# Patient Record
Sex: FEMALE | Race: WHITE | NOT HISPANIC OR LATINO | Employment: FULL TIME | URBAN - METROPOLITAN AREA
[De-identification: names, ages, dates, MRNs, and addresses within clinical notes are randomized per-mention and may not be internally consistent; named-entity substitution may affect disease eponyms.]

---

## 2023-02-14 PROBLEM — M85.80 OSTEOPENIA: Status: ACTIVE | Noted: 2023-02-14

## 2023-02-14 PROBLEM — E53.8 VITAMIN B12 DEFICIENCY: Status: ACTIVE | Noted: 2023-02-14

## 2023-02-14 PROBLEM — M75.42 IMPINGEMENT SYNDROME OF LEFT SHOULDER: Status: ACTIVE | Noted: 2023-02-14

## 2023-02-14 PROBLEM — E78.9 BORDERLINE HIGH CHOLESTEROL: Status: ACTIVE | Noted: 2023-02-14

## 2023-02-14 PROBLEM — R53.81 MALAISE AND FATIGUE: Status: ACTIVE | Noted: 2023-02-14

## 2023-02-14 PROBLEM — R53.83 MALAISE AND FATIGUE: Status: ACTIVE | Noted: 2023-02-14

## 2023-02-14 PROBLEM — L21.0 SEBORRHEA CAPITIS: Status: ACTIVE | Noted: 2023-02-14

## 2023-02-14 PROBLEM — L30.9 ECZEMA: Status: ACTIVE | Noted: 2023-02-14

## 2023-02-14 PROBLEM — R19.5 POSITIVE COLORECTAL CANCER SCREENING USING COLOGUARD TEST: Status: ACTIVE | Noted: 2023-02-14

## 2023-02-14 PROBLEM — G44.311 INTRACTABLE ACUTE POST-TRAUMATIC HEADACHE: Status: ACTIVE | Noted: 2023-02-14

## 2023-02-14 PROBLEM — H61.21 IMPACTED CERUMEN OF RIGHT EAR: Status: ACTIVE | Noted: 2023-02-14

## 2023-02-14 PROBLEM — K63.5 COLON POLYPS: Status: ACTIVE | Noted: 2023-02-14

## 2023-04-04 RX ORDER — KETOROLAC TROMETHAMINE 30 MG/ML
1 INJECTION, SOLUTION INTRAMUSCULAR; INTRAVENOUS
COMMUNITY
End: 2023-04-05 | Stop reason: ALTCHOICE

## 2023-04-05 ENCOUNTER — OFFICE VISIT (OUTPATIENT)
Dept: PRIMARY CARE | Facility: CLINIC | Age: 68
End: 2023-04-05
Payer: COMMERCIAL

## 2023-04-05 VITALS
OXYGEN SATURATION: 98 % | TEMPERATURE: 96.8 F | WEIGHT: 113 LBS | DIASTOLIC BLOOD PRESSURE: 82 MMHG | HEART RATE: 92 BPM | BODY MASS INDEX: 20.8 KG/M2 | SYSTOLIC BLOOD PRESSURE: 148 MMHG | HEIGHT: 62 IN

## 2023-04-05 DIAGNOSIS — J06.9 ACUTE URI: ICD-10-CM

## 2023-04-05 DIAGNOSIS — E78.9 BORDERLINE HIGH CHOLESTEROL: ICD-10-CM

## 2023-04-05 DIAGNOSIS — H91.90 HEARING LOSS, UNSPECIFIED HEARING LOSS TYPE, UNSPECIFIED LATERALITY: ICD-10-CM

## 2023-04-05 DIAGNOSIS — K63.5 POLYP OF COLON, UNSPECIFIED PART OF COLON, UNSPECIFIED TYPE: ICD-10-CM

## 2023-04-05 DIAGNOSIS — L30.9 ECZEMA, UNSPECIFIED TYPE: ICD-10-CM

## 2023-04-05 DIAGNOSIS — Z11.59 NEED FOR HEPATITIS C SCREENING TEST: ICD-10-CM

## 2023-04-05 DIAGNOSIS — M85.80 OSTEOPENIA, UNSPECIFIED LOCATION: ICD-10-CM

## 2023-04-05 DIAGNOSIS — Z00.00 MEDICARE ANNUAL WELLNESS VISIT, SUBSEQUENT: Primary | ICD-10-CM

## 2023-04-05 DIAGNOSIS — E53.8 VITAMIN B12 DEFICIENCY: ICD-10-CM

## 2023-04-05 DIAGNOSIS — Z23 ENCOUNTER FOR IMMUNIZATION: ICD-10-CM

## 2023-04-05 PROBLEM — M75.42 IMPINGEMENT SYNDROME OF LEFT SHOULDER: Status: RESOLVED | Noted: 2023-02-14 | Resolved: 2023-04-05

## 2023-04-05 PROBLEM — G44.311 INTRACTABLE ACUTE POST-TRAUMATIC HEADACHE: Status: RESOLVED | Noted: 2023-02-14 | Resolved: 2023-04-05

## 2023-04-05 PROBLEM — R19.5 POSITIVE COLORECTAL CANCER SCREENING USING COLOGUARD TEST: Status: RESOLVED | Noted: 2023-02-14 | Resolved: 2023-04-05

## 2023-04-05 PROCEDURE — 90677 PCV20 VACCINE IM: CPT | Performed by: FAMILY MEDICINE

## 2023-04-05 PROCEDURE — G0439 PPPS, SUBSEQ VISIT: HCPCS | Performed by: FAMILY MEDICINE

## 2023-04-05 PROCEDURE — 90471 IMMUNIZATION ADMIN: CPT | Performed by: FAMILY MEDICINE

## 2023-04-05 PROCEDURE — 1159F MED LIST DOCD IN RCRD: CPT | Performed by: FAMILY MEDICINE

## 2023-04-05 PROCEDURE — 99214 OFFICE O/P EST MOD 30 MIN: CPT | Performed by: FAMILY MEDICINE

## 2023-04-05 PROCEDURE — 1160F RVW MEDS BY RX/DR IN RCRD: CPT | Performed by: FAMILY MEDICINE

## 2023-04-05 PROCEDURE — 1170F FXNL STATUS ASSESSED: CPT | Performed by: FAMILY MEDICINE

## 2023-04-05 PROCEDURE — 1036F TOBACCO NON-USER: CPT | Performed by: FAMILY MEDICINE

## 2023-04-05 RX ORDER — CLOBETASOL PROPIONATE 0.46 MG/ML
SOLUTION TOPICAL
COMMUNITY
Start: 2023-03-01 | End: 2023-10-05 | Stop reason: ALTCHOICE

## 2023-04-05 RX ORDER — FLUTICASONE FUROATE 27.5 UG/1
1 SPRAY, METERED NASAL
Qty: 10 G | Refills: 5 | Status: SHIPPED | OUTPATIENT
Start: 2023-04-05 | End: 2023-10-05 | Stop reason: ALTCHOICE

## 2023-04-05 RX ORDER — DUPILUMAB 300 MG/2ML
INJECTION, SOLUTION SUBCUTANEOUS
COMMUNITY
Start: 2023-03-17

## 2023-04-05 ASSESSMENT — PATIENT HEALTH QUESTIONNAIRE - PHQ9
1. LITTLE INTEREST OR PLEASURE IN DOING THINGS: NOT AT ALL
SUM OF ALL RESPONSES TO PHQ9 QUESTIONS 1 AND 2: 0
2. FEELING DOWN, DEPRESSED OR HOPELESS: NOT AT ALL

## 2023-04-05 ASSESSMENT — VISUAL ACUITY
OD_CC: 20/40
OS_CC: 20/40

## 2023-04-05 ASSESSMENT — ACTIVITIES OF DAILY LIVING (ADL)
DOING_HOUSEWORK: INDEPENDENT
TAKING_MEDICATION: INDEPENDENT
MANAGING_FINANCES: INDEPENDENT
GROCERY_SHOPPING: INDEPENDENT
DRESSING: INDEPENDENT

## 2023-04-05 NOTE — PROGRESS NOTES
Subjective   Patient ID: Delaney Novoa is a 67 y.o. female who presents for medicare well  HPI    The patient is being seen for the annual wellness visit.   Past Medical, Surgical and Family History: reviewed and updated in chart.   Interval History: Patient has not been hospitalized previously.   Medications and Supplements: Medications and supplements, including calcium and vitamins reviewed and updated in chart.    No, the patient is not using opioids.   Patient Self Assessment of Health Status: good.   Tobacco use: Non-User   Alcohol use: User   Illicit drug use: Non-User   Current diet: unhealthy diet, does consume adequate fluids and does consume caffeine.   Exercise Frequency: regularly.   Depression/Suicide Screening:  .   During the past 2 weeks, the patient has not felt down, depressed or hopeless.    During the past 2 weeks, the patient has not felt little interest or pleasure in doing things.    Hearing Impairment: Patient has slight hearing impairment, on the right.   Cognitive Impairment: No cognitive impairment observed.     Bathing: performs independently.   Dressing: performs independently.   Walking: performs independently.   Toileting: performs independently.   Feeding: performs independently.   Personal Hygiene: performs independently.   Bowels: continent.   Bladder: continent.   Managing Finances: performs independently.   Shopping: performs independently.   Managing Medications: performs independently.   Housework / Basic Home Maintenance: performs independently.   Handling Transportation: performs independently.   Preparing Meals: performs independently.    Falls Risk Screening:. DELANEY has not fallen in the last 6 months. Her fall did not result in injury.   Home safety risk factors: none.   Advance directives:. Advance Directives discussed, verbal or written information provided if indicated. Patient has living will. Patient has healthcare POA.     The patient's health since the last visit is  described as good. There are no interval changes in the patient's PMH, PSH, and current medications. There are no interval changes in the patient's social and family history. She does not have regular dental visits. The patient wears dentures. She complains of vision problems. Vision care includes wearing glasses. The patient complains of worsening vision and macular degneration starting. She denies hearing loss. Immunizations status: not up to date.   Reproductive health: the patient is postmenopausal.   Cervical cancer screening: cancer screening reviewed and current . patient has no history of an abnormal pap smear.       Screening interval recommendation: Dr Leal in [place]. 1 year ago.   Breast cancer screening: cancer screening reviewed and current. Screening interval recommendation: 11/2020.   Colorectal Cancer Screening: colonoscopy ordered. a colonoscopy was performed Feb 2022 and polyps and has 3-5 years.   Metabolic screening: lipid profile performed within the past five years.     Review of Systems    Objective   Physical Exam  General: Patient is alert and oriented ×3 and appears in no acute distress. Next    Head: Atraumatic normocephalic    Eyes: EOMI, PERRLA    Ears: Right canal is impacted with cerumen.  Left canal clear without any inflammation to the tympanic membrane.    Mouth: Moist mucosa, no oral lesions, tonsils are normal in size without erythema, there is some drainage down the posterior pharynx is clear. Has dentures.  Drainage down the posterior pharynx    Neck: No adenopathy    Heart: Regular rate and rhythm no murmurs clicks or gallops    Lungs: Clear to auscultation bilaterally without rhonchi rales or wheezing.    Abdomen: Soft, nontender, no rigidity rebound or guarding    Musculoskeletal: Strength is grossly intact at 5 out of 5 2010 reflexes intact, sensation intact. Patient had negative open can test on the left, positive Jacob sign on the left    Skin: Patient has flaky  white dry rash occurring on her scalp. Also has some occurring on her ears.     Assessment/Plan   Problem List Items Addressed This Visit    None  Reviewed in for the patient's past medical history, surgical history, family history, social history  Depression screening was done in the office today using PHQ-2  We reviewed the patient's activities of daily living and possible risk for falling. Patient is stable.  Discussed preventative screenings  Vaccinations were discussed in the office. We did review the patient's status on influenza vaccination, pneumonia vaccination, tetanus vaccination, and given Prevnar 13 on October 27, 2021 and flu vaccination on October 27, 2021  Patient was instructed to follow-up with dentist regularly and also with eye doctor regularly  We discussed advanced directives including power of , living well and DO NOT RESUSCITATE orders    Labs ordered     Acute URI- Suggested Mucinex DM, salt water nasal sprays and fluticasone nasal spray.  If worsening symptoms she was instructed to contact the office    Eczema-USe steroid cream as needed and Dr Yeimy Osborn sees her.  Dupixant started and helping.     Osteopenia   Doing exericse  Taking Calcium and Vitamin D    Polyps  - Repeat colonoscopy in 2-4 years

## 2023-04-06 ENCOUNTER — TELEPHONE (OUTPATIENT)
Dept: PRIMARY CARE | Facility: CLINIC | Age: 68
End: 2023-04-06
Payer: COMMERCIAL

## 2023-04-06 DIAGNOSIS — T78.40XA ALLERGIC REACTION TO DRUG, INITIAL ENCOUNTER: Primary | ICD-10-CM

## 2023-04-06 RX ORDER — PREDNISONE 10 MG/1
TABLET ORAL
Qty: 21 TABLET | Refills: 0 | Status: SHIPPED | OUTPATIENT
Start: 2023-04-06 | End: 2023-04-12

## 2023-04-06 NOTE — TELEPHONE ENCOUNTER
Patient said after taking mucinex, patient developed rashes on face and neck. Concerned about allergic reaction. Please advise

## 2023-10-05 ENCOUNTER — OFFICE VISIT (OUTPATIENT)
Dept: PRIMARY CARE | Facility: CLINIC | Age: 68
End: 2023-10-05
Payer: COMMERCIAL

## 2023-10-05 VITALS
OXYGEN SATURATION: 96 % | WEIGHT: 114 LBS | SYSTOLIC BLOOD PRESSURE: 116 MMHG | TEMPERATURE: 97.6 F | BODY MASS INDEX: 20.98 KG/M2 | HEIGHT: 62 IN | HEART RATE: 73 BPM | DIASTOLIC BLOOD PRESSURE: 80 MMHG

## 2023-10-05 DIAGNOSIS — Z00.00 ANNUAL PHYSICAL EXAM: Primary | ICD-10-CM

## 2023-10-05 DIAGNOSIS — H61.21 IMPACTED CERUMEN OF RIGHT EAR: ICD-10-CM

## 2023-10-05 PROCEDURE — 69210 REMOVE IMPACTED EAR WAX UNI: CPT | Performed by: FAMILY MEDICINE

## 2023-10-05 PROCEDURE — 90662 IIV NO PRSV INCREASED AG IM: CPT | Performed by: FAMILY MEDICINE

## 2023-10-05 PROCEDURE — 1160F RVW MEDS BY RX/DR IN RCRD: CPT | Performed by: FAMILY MEDICINE

## 2023-10-05 PROCEDURE — 1036F TOBACCO NON-USER: CPT | Performed by: FAMILY MEDICINE

## 2023-10-05 PROCEDURE — 99397 PER PM REEVAL EST PAT 65+ YR: CPT | Performed by: FAMILY MEDICINE

## 2023-10-05 PROCEDURE — 90471 IMMUNIZATION ADMIN: CPT | Performed by: FAMILY MEDICINE

## 2023-10-05 PROCEDURE — 1159F MED LIST DOCD IN RCRD: CPT | Performed by: FAMILY MEDICINE

## 2023-10-05 ASSESSMENT — PATIENT HEALTH QUESTIONNAIRE - PHQ9
SUM OF ALL RESPONSES TO PHQ9 QUESTIONS 1 AND 2: 0
2. FEELING DOWN, DEPRESSED OR HOPELESS: NOT AT ALL
1. LITTLE INTEREST OR PLEASURE IN DOING THINGS: NOT AT ALL

## 2023-10-05 NOTE — PROGRESS NOTES
Subjective   Patient ID: Delaney Novoa is a 68 y.o. female who presents for Annual physical exam  HPI    The patient's health since the last visit is described as good. There are no interval changes in the patient's PMH, PSH, and current medications. There are no interval changes in the patient's social and family history. She does not have regular dental visits. The patient wears dentures. She complains of vision problems. Vision care includes wearing glasses. The patient complains of worsening vision and macular degneration starting. She denies hearing loss. Immunizations status: not up to date.   Reproductive health: the patient is postmenopausal.  Janes Kolb  Cervical cancer screening: cancer screening reviewed and current . patient has no history of an abnormal pap smear.   Breast cancer screening: cancer screening reviewed and current. Screening interval recommendation every year  Colorectal Cancer Screening: colonoscopy ordered. a colonoscopy was performed Feb 2022 and polyps and has 3-5 years.   Metabolic screening: lipid profile performed within the past five years.     Review of Systems    Objective   Physical Exam  General: Patient is alert and oriented ×3 and appears in no acute distress. Next    Head: Atraumatic normocephalic    Eyes: EOMI, PERRLA    Ears: Right canal is impacted with cerumen.  Left canal clear without any inflammation to the tympanic membrane.    Mouth: Moist mucosa, no oral lesions, tonsils are normal in size without erythema, there is some drainage down the posterior pharynx is clear. Has dentures.  Drainage down the posterior pharynx    Neck: No adenopathy    Heart: Regular rate and rhythm no murmurs clicks or gallops    Lungs: Clear to auscultation bilaterally without rhonchi rales or wheezing.    Abdomen: Soft, nontender, no rigidity rebound or guarding    Musculoskeletal: Strength is grossly intact at 5 out of 5 2010 reflexes intact, sensation intact. Patient had negative open  can test on the left, positive Jacob sign on the left    Skin: Patient has flaky white dry rash occurring on her scalp. Also has some occurring on her ears.     Assessment/Plan   Problem List Items Addressed This Visit       Impacted cerumen of right ear     Other Visit Diagnoses       Annual physical exam    -  Primary        Reviewed in for the patient's past medical history, surgical history, family history, social history  Depression screening was done in the office today using PHQ-2  We reviewed the patient's activities of daily living and possible risk for falling. Patient is stable.  Discussed preventative screenings  Vaccinations were discussed in the office. We did review the patient's status on influenza vaccination, pneumonia vaccination, tetanus vaccination, and given Prevnar 20 2023 and influenza given today  Patient was instructed to follow-up with dentist regularly and also with eye doctor regularly  We discussed advanced directives including power of , living well and DO NOT RESUSCITATE orders    Cerumen impaction removed with lavage in the right ear    Eczema- Dr Yeimy Osborn sees her.  She is looking for a new dermatoligist    Osteopenia   Doing exericse  Taking Calcium and Vitamin D    Polyps  - Repeat colonoscopy in 2025

## 2023-12-05 ENCOUNTER — TELEPHONE (OUTPATIENT)
Dept: PRIMARY CARE | Facility: CLINIC | Age: 68
End: 2023-12-05
Payer: COMMERCIAL

## 2023-12-05 NOTE — TELEPHONE ENCOUNTER
Pt has chest congestion and losing her voice, her head clogs up then she has to keep blowing her nose.  Used some cough syrup she has at home but not really helping and was looking for over the counter suggestions to try and if not better then will schedule appt next week when Dr Zee gets back.  She also took today off and she said she can take 3 days off but they would like a doctor's excuse and was hoping she could get one for today tomorrow and Thursday to help her try to get a little better.  Told her I would ask you and get back to her.    Called and left message on voicemail with recommendations and asked she call tomorrow to let us know how she wants to get work note.

## 2023-12-05 NOTE — TELEPHONE ENCOUNTER
I recommend mucinex (guaifenesin) over the counter. Recommend fluids and rest. Okay to write her a note.

## 2024-01-11 ENCOUNTER — TELEMEDICINE (OUTPATIENT)
Dept: PRIMARY CARE | Facility: CLINIC | Age: 69
End: 2024-01-11
Payer: COMMERCIAL

## 2024-01-11 DIAGNOSIS — J06.9 VIRAL UPPER RESPIRATORY TRACT INFECTION: Primary | ICD-10-CM

## 2024-01-11 DIAGNOSIS — R05.1 ACUTE COUGH: ICD-10-CM

## 2024-01-11 PROBLEM — M75.40 IMPINGEMENT SYNDROME OF SHOULDER REGION: Status: ACTIVE | Noted: 2024-01-11

## 2024-01-11 PROCEDURE — 99442 PR PHYS/QHP TELEPHONE EVALUATION 11-20 MIN: CPT | Performed by: FAMILY MEDICINE

## 2024-01-11 RX ORDER — FLUTICASONE PROPIONATE 50 MCG
1 SPRAY, SUSPENSION (ML) NASAL DAILY
Qty: 16 G | Refills: 0 | Status: SHIPPED | OUTPATIENT
Start: 2024-01-11 | End: 2025-01-10

## 2024-01-11 RX ORDER — DOXYCYCLINE 100 MG/1
100 CAPSULE ORAL 2 TIMES DAILY
Qty: 14 CAPSULE | Refills: 0 | Status: SHIPPED | OUTPATIENT
Start: 2024-01-11 | End: 2024-01-18

## 2024-01-11 RX ORDER — BENZONATATE 200 MG/1
200 CAPSULE ORAL 3 TIMES DAILY PRN
Qty: 30 CAPSULE | Refills: 0 | Status: SHIPPED | OUTPATIENT
Start: 2024-01-11 | End: 2024-01-21

## 2024-01-11 ASSESSMENT — ENCOUNTER SYMPTOMS
DIARRHEA: 0
CHEST TIGHTNESS: 0
CHILLS: 0
TROUBLE SWALLOWING: 0
FEVER: 0
SINUS PAIN: 1
SINUS PRESSURE: 1
ABDOMINAL PAIN: 0
RHINORRHEA: 1
NAUSEA: 0
COUGH: 1
VOMITING: 0
CONSTIPATION: 0
FATIGUE: 1
SHORTNESS OF BREATH: 0
WHEEZING: 1
SORE THROAT: 0
VOICE CHANGE: 1
HEADACHES: 0

## 2024-01-11 NOTE — PROGRESS NOTES
Subjective   Patient ID: Delaney Novoa is a 68 y.o. female who presents for No chief complaint on file..    HPI   Delaney Novoa is a 68 y.o. female who presents for a telephone visit due to a cold that's been going on for the past 3 weeks. Says that it started with some chest congestion and coughing. Her coughing has stuck around and her voice comes and goes, sinus pressure/pain, with congestion. She has tried some OTC cough syrup and it's helped a little. She feels like she is improving but still has a hoarse voice.    Review of Systems   Constitutional:  Positive for fatigue. Negative for chills and fever.   HENT:  Positive for congestion, postnasal drip, rhinorrhea, sinus pressure, sinus pain and voice change. Negative for sore throat and trouble swallowing.    Respiratory:  Positive for cough and wheezing. Negative for chest tightness and shortness of breath.    Cardiovascular:  Negative for chest pain.   Gastrointestinal:  Negative for abdominal pain, constipation, diarrhea, nausea and vomiting.   Neurological:  Negative for headaches.       Objective   Physical Exam  Telehealth visit    Assessment/Plan   Problem List Items Addressed This Visit    None  Visit Diagnoses       Viral upper respiratory tract infection    -  Primary    Acute cough            - Salt water nasal rinses  - Salt water gargles.    -Tessalon Marisel  -Flonase   - Doxycycline  Time on the phone 11 min

## 2024-04-04 ENCOUNTER — APPOINTMENT (OUTPATIENT)
Dept: PRIMARY CARE | Facility: CLINIC | Age: 69
End: 2024-04-04
Payer: COMMERCIAL

## 2024-04-08 ENCOUNTER — TELEPHONE (OUTPATIENT)
Dept: PRIMARY CARE | Facility: CLINIC | Age: 69
End: 2024-04-08

## 2024-04-08 ENCOUNTER — APPOINTMENT (OUTPATIENT)
Dept: PRIMARY CARE | Facility: CLINIC | Age: 69
End: 2024-04-08
Payer: COMMERCIAL

## 2024-04-08 DIAGNOSIS — E53.8 VITAMIN B12 DEFICIENCY: Primary | ICD-10-CM

## 2024-04-08 DIAGNOSIS — M85.80 OSTEOPENIA, UNSPECIFIED LOCATION: ICD-10-CM

## 2024-04-08 DIAGNOSIS — E78.9 BORDERLINE HIGH CHOLESTEROL: ICD-10-CM

## 2024-04-08 NOTE — TELEPHONE ENCOUNTER
We cancelled her appt for today and she made it for oct. She needs new labs ordered because the ones she has will .

## 2024-10-08 ENCOUNTER — APPOINTMENT (OUTPATIENT)
Dept: PRIMARY CARE | Facility: CLINIC | Age: 69
End: 2024-10-08
Payer: COMMERCIAL

## 2024-10-08 VITALS
TEMPERATURE: 97.8 F | HEIGHT: 62 IN | BODY MASS INDEX: 20.98 KG/M2 | HEART RATE: 72 BPM | OXYGEN SATURATION: 98 % | SYSTOLIC BLOOD PRESSURE: 128 MMHG | WEIGHT: 114 LBS | DIASTOLIC BLOOD PRESSURE: 76 MMHG

## 2024-10-08 DIAGNOSIS — H93.11 TINNITUS, RIGHT EAR: ICD-10-CM

## 2024-10-08 DIAGNOSIS — Z00.00 ANNUAL PHYSICAL EXAM: ICD-10-CM

## 2024-10-08 DIAGNOSIS — L29.9 PRURITUS, UNSPECIFIED: ICD-10-CM

## 2024-10-08 DIAGNOSIS — E78.9 BORDERLINE HIGH CHOLESTEROL: ICD-10-CM

## 2024-10-08 DIAGNOSIS — R49.0 CHRONIC HOARSENESS: ICD-10-CM

## 2024-10-08 DIAGNOSIS — H61.21 IMPACTED CERUMEN OF RIGHT EAR: ICD-10-CM

## 2024-10-08 DIAGNOSIS — Z12.31 BREAST CANCER SCREENING BY MAMMOGRAM: ICD-10-CM

## 2024-10-08 DIAGNOSIS — Z00.00 MEDICARE ANNUAL WELLNESS VISIT, SUBSEQUENT: Primary | ICD-10-CM

## 2024-10-08 DIAGNOSIS — E53.8 VITAMIN B12 DEFICIENCY: ICD-10-CM

## 2024-10-08 DIAGNOSIS — H91.90 HEARING LOSS, UNSPECIFIED HEARING LOSS TYPE, UNSPECIFIED LATERALITY: ICD-10-CM

## 2024-10-08 DIAGNOSIS — K63.5 POLYP OF COLON, UNSPECIFIED PART OF COLON, UNSPECIFIED TYPE: ICD-10-CM

## 2024-10-08 DIAGNOSIS — Z23 ENCOUNTER FOR IMMUNIZATION: ICD-10-CM

## 2024-10-08 DIAGNOSIS — M85.80 OSTEOPENIA, UNSPECIFIED LOCATION: ICD-10-CM

## 2024-10-08 DIAGNOSIS — Z87.891 HISTORY OF TOBACCO ABUSE: ICD-10-CM

## 2024-10-08 PROCEDURE — 3008F BODY MASS INDEX DOCD: CPT | Performed by: FAMILY MEDICINE

## 2024-10-08 PROCEDURE — 90662 IIV NO PRSV INCREASED AG IM: CPT | Performed by: FAMILY MEDICINE

## 2024-10-08 PROCEDURE — 99214 OFFICE O/P EST MOD 30 MIN: CPT | Performed by: FAMILY MEDICINE

## 2024-10-08 PROCEDURE — 1159F MED LIST DOCD IN RCRD: CPT | Performed by: FAMILY MEDICINE

## 2024-10-08 PROCEDURE — G0008 ADMIN INFLUENZA VIRUS VAC: HCPCS | Performed by: FAMILY MEDICINE

## 2024-10-08 PROCEDURE — 1170F FXNL STATUS ASSESSED: CPT | Performed by: FAMILY MEDICINE

## 2024-10-08 PROCEDURE — 1123F ACP DISCUSS/DSCN MKR DOCD: CPT | Performed by: FAMILY MEDICINE

## 2024-10-08 PROCEDURE — 1158F ADVNC CARE PLAN TLK DOCD: CPT | Performed by: FAMILY MEDICINE

## 2024-10-08 PROCEDURE — G0439 PPPS, SUBSEQ VISIT: HCPCS | Performed by: FAMILY MEDICINE

## 2024-10-08 PROCEDURE — 1036F TOBACCO NON-USER: CPT | Performed by: FAMILY MEDICINE

## 2024-10-08 PROCEDURE — 69210 REMOVE IMPACTED EAR WAX UNI: CPT | Performed by: FAMILY MEDICINE

## 2024-10-08 ASSESSMENT — ACTIVITIES OF DAILY LIVING (ADL)
DOING_HOUSEWORK: INDEPENDENT
TAKING_MEDICATION: INDEPENDENT
GROCERY_SHOPPING: INDEPENDENT
BATHING: INDEPENDENT
MANAGING_FINANCES: INDEPENDENT
DRESSING: INDEPENDENT

## 2024-10-08 ASSESSMENT — PATIENT HEALTH QUESTIONNAIRE - PHQ9
1. LITTLE INTEREST OR PLEASURE IN DOING THINGS: NOT AT ALL
2. FEELING DOWN, DEPRESSED OR HOPELESS: NOT AT ALL
SUM OF ALL RESPONSES TO PHQ9 QUESTIONS 1 AND 2: 0

## 2024-10-08 NOTE — PROGRESS NOTES
Subjective   Patient ID: Delaney Novoa is a 69 y.o. female who presents for Medicare Annual Wellness Visit Subsequent (Thinks she needs some blood work done. /Her throat gets dry/Wants doctor for ENT in [place]).         Reviewed all medications by prescribing practitioner or clinical pharmacist (such as prescriptions, OTCs, herbal therapies and supplements) and documented in the medical record.    HPI  1.  Physical exam.  Pap: last done   Mammogram: last done 11/2023  Colonoscopy: positive cologuard 2021-last colonoscopy 2022, 3 year clearance  Immunizations: High dose flu, shingles  Labs needed, currently pending  Low dose CT scan?    2. Referral for ENT  Tinnitus in R ear   Flaking and itching of scalp, seeing dermatologist, history of eczema and states this is not the same     Review of Systems  All pertinent positive symptoms are included in the history of present illness.    All other systems have been reviewed and are negative and noncontributory to this patient's current ailments.    No past medical history on file.  Past Surgical History:   Procedure Laterality Date    OTHER SURGICAL HISTORY  09/09/2019    Kidney surgery    OTHER SURGICAL HISTORY  12/27/2021    Carpal tunnel surgery     Social History     Tobacco Use    Smoking status: Former     Current packs/day: 0.50     Average packs/day: 0.5 packs/day for 49.8 years (24.9 ttl pk-yrs)     Types: Cigarettes     Start date: 1/6/1975    Smokeless tobacco: Never   Substance Use Topics    Alcohol use: Yes    Drug use: Never     Family History   Problem Relation Name Age of Onset    Diabetes Mother      Stroke Father      Diabetes Father      Heart attack Father      Diabetes Sister      Heart attack Brother       Immunization History   Administered Date(s) Administered    Flu vaccine, quadrivalent, high-dose, preservative free, age 65y+ (FLUZONE) 10/05/2023    Flu vaccine, trivalent, preservative free, HIGH-DOSE, age 65y+ (Fluzone) 10/08/2024    Influenza,  "seasonal, injectable 09/07/2022    [facility] SARS-CoV-2 Vaccination 10/22/2021    Pneumococcal conjugate vaccine, 13-valent (PREVNAR 13) 10/27/2021    Pneumococcal conjugate vaccine, 20-valent (PREVNAR 20) 04/05/2023     Current Outpatient Medications   Medication Instructions    cyanocobalamin (VITAMIN B-12) 1,000 mcg, intramuscular, Once    Dupixent Pen 300 mg/2 mL injection     fluticasone (Flonase) 50 mcg/actuation nasal spray 1 spray, Each Nostril, Daily, Shake gently. Before first use, prime pump. After use, clean tip and replace cap.    multivitamin with minerals (multivitamin-iron-folic acid) tablet oral    mv,jessica,min/iron/folic acid/lut (COMPLETE MULTI ORAL) oral    vit A/vit C/vit E/zinc/copper (PRESERVISION AREDS ORAL) oral     Allergies   Allergen Reactions    Codeine Unknown       Objective   Vitals:    10/08/24 1033   BP: 128/76   BP Location: Left arm   Patient Position: Sitting   BP Cuff Size: Adult   Pulse: 72   Temp: 36.6 °C (97.8 °F)   SpO2: 98%   Weight: 51.7 kg (114 lb)   Height: 1.575 m (5' 2\")     Body mass index is 20.85 kg/m².    BP Readings from Last 3 Encounters:   10/08/24 128/76   10/05/23 116/80   04/05/23 148/82      Wt Readings from Last 3 Encounters:   10/08/24 51.7 kg (114 lb)   10/05/23 51.7 kg (114 lb)   04/05/23 51.3 kg (113 lb)        No visits with results within 1 Month(s) from this visit.   Latest known visit with results is:   [facility] Encounter on 02/09/2022   Component Date Value    Pathology Report 02/09/2022                      Value:Name EDD PRATER                                                                                                   Accession #: [id number]            Pathologist:                   KEVAN MANE M.D.  Date of Procedure:    2/9/2022  Date Received:          2/9/2022  Date Reported           2/11/2022  Submitting Physician:   ANAI LAWTON MD  Location:                    [facility]   Copy To/Referring/Attending:  JEFF SRIVASTAVA,DO Other " "External #                                                                    FINAL DIAGNOSIS  A.  POLYP (ASCENDING COLON), EXCISIONAL BIOPSY:       - ADENOMATOUS POLYP (TUBULAR ADENOMA).    B.  POLYPS X2 (DESCENDING COLON), EXCISIONAL BIOPSIES:       - ONE ADENOMATOUS POLYP (TUBULAR ADENOMA) AND ONE SESSILE SERRATED            LESION/POLYP WITHOUT DYSPLASIA..    C.  POLYP (RECTUM), EXCISIONAL BIOPSY:         - HYPERPLASTIC POLYP.                                                                                                                                                                                                                                                                                                                                                                                                                                                                                                               Electronically Signed Out By KEVAN MANE M.D./TOPHER  By the signature on this report, the individual or group listed as making the  Final Interpretation/Diagnosis certifies that they have reviewed this case.             Microscopic Description:  Microscopic slides examined.    Clinical History:  Positive Cologuard test; colon polyps    Specimens Submitted As:  A: Polyp (ascending colon)   B: Polyps x2 (descending colon)   C: Polyp (rectum)     Gross Description:  A:  Received in formalin, labeled with the patient's name and hospital number  and \"ascending colon polyp\", is a fragment of tan, soft tissue measuring 0.3 x  0.2 x 0.2 cm.  The sp                          ecimen is submitted in toto in one cassette.  RCC    B:  Received in formalin, labeled with the patient's name and hospital number  and \"descending colon polyp x2\", are multiple fragments of tan, soft tissue  aggregating to 0.9 x 0.8 x 0.2 cm.  The specimen is submitted in toto in one  cassette.  RCC    C:  Received in formalin, labeled " "with the patient's name and hospital number  and \"rectal polyp\", is a fragment of tan, soft tissue measuring 0.3 x 0.3 x 0.2  cm.  The specimen is submitted in toto in one cassette.  RCC    rcc/2/10/2022               [facility]  Department of Pathology   [address] [place] [address]        CONVERTED FINAL DIAGNOSIS 02/09/2022                      Value:A.  POLYP (ASCENDING COLON), EXCISIONAL BIOPSY:       - ADENOMATOUS POLYP (TUBULAR ADENOMA).    B.  POLYPS X2 (DESCENDING COLON), EXCISIONAL BIOPSIES:       - ONE ADENOMATOUS POLYP (TUBULAR ADENOMA) AND ONE SESSILE SERRATED            LESION/POLYP WITHOUT DYSPLASIA..    C.  POLYP (RECTUM), EXCISIONAL BIOPSY:         - HYPERPLASTIC POLYP.      CONVERTED CLINICAL DIAGN* 02/09/2022 Positive Cologuard test; colon polyps     CONVERTED GROSS DESCRIPT* 02/09/2022                      Value:A:  Received in formalin, labeled with the patient's name and hospital number  and \"ascending colon polyp\", is a fragment of tan, soft tissue measuring 0.3 x  0.2 x 0.2 cm.  The specimen is submitted in toto in one cassette.  RCC    B:  Received in formalin, labeled with the patient's name and hospital number  and \"descending colon polyp x2\", are multiple fragments of tan, soft tissue  aggregating to 0.9 x 0.8 x 0.2 cm.  The specimen is submitted in toto in one  cassette.  RCC    C:  Received in formalin, labeled with the patient's name and hospital number  and \"rectal polyp\", is a fragment of tan, soft tissue measuring 0.3 x 0.3 x 0.2  cm.  The specimen is submitted in toto in one cassette.  RCC      CONVERTED MICROSCOPIC DE* 02/09/2022 Microscopic slides examined.     CONVERTED FINAL REPORT P* 02/09/2022 \\copath[facility]\copath\PDF [id number]_[id number]      Physical Exam  General: Pt is sitting up in chair. Appears well-nourished. In no acute distress.  HENT: Cerumen impaction right ear.  Left canal patent.  Oropharynx without erythema " or exudate.  Neck: No cervical lymphadenopathy. No thyromegaly. No carotid bruits.  CV: S1S2 normal. Regular rate and rhythm. No murmurs, rubs, or gallops.  Resp: Clear to auscultation in all lobes. Good aeration. No rales, rhonchi, or wheezes.  GI: Soft, no tenderness to palpation. No organomegaly. No abdominal bruits.   Extremities: No lower extremity edema bilaterally.   Skin: Warm and dry. No rashes or bruising.   Psych: Appropriate responses and actions.       Assessment/Plan   Problem List Items Addressed This Visit       Borderline high cholesterol    Relevant Orders    Tsh With Reflex To Free T4 If Abnormal    Colon polyps    Impacted cerumen of right ear    Osteopenia    Relevant Orders    Tsh With Reflex To Free T4 If Abnormal    Vitamin B12 deficiency    Medicare annual wellness visit, subsequent - Primary    Hearing loss    Encounter for immunization    Relevant Orders    Flu vaccine, trivalent, preservative free, HIGH-DOSE, age 65y+ (Fluzone) (Completed)     Other Visit Diagnoses       Annual physical exam        Pruritus, unspecified        Relevant Orders    Tsh With Reflex To Free T4 If Abnormal    Breast cancer screening by mammogram        Relevant Orders    BI mammo bilateral screening tomosynthesis    History of tobacco abuse        Relevant Orders    CT lung screening low dose    Tinnitus, right ear        Relevant Orders    Referral to ENT    Chronic hoarseness        Relevant Orders    Referral to ENT             Reviewed in for the patient's past medical history, surgical history, family history, social history  Depression screening was done in the office today using PHQ-2  We reviewed the patient's activities of daily living and possible risk for falling. Patient is stable.  Discussed preventative screenings  Vaccinations were discussed in the office. We did review the patient's status on influenza vaccination, pneumonia vaccination, tetanus vaccination, and given Prevnar 20 2023.  DAVID  Influenza given today  Patient was instructed to follow-up with dentist regularly and also with eye doctor regularly  We discussed advanced directives including power of , living well and DO NOT RESUSCITATE orders     Tinnitus right ear and chronic hoarsness  - Consult ENT for further evaluation    Right ear cerumen impaction  - Removed with lavage    Tobacco history  - Low dose CT ordered    Mammogram ordered  Colonoscopy due in 2025  Osteopenia followed Dr Hernandez in [place] her [facility]  Doing exercise  Taking Calcium and Vitamin D     Polyps  Repeat colonoscopy in 2025

## 2024-10-21 ENCOUNTER — HOSPITAL ENCOUNTER (OUTPATIENT)
Dept: RADIOLOGY | Facility: CLINIC | Age: 69
Discharge: HOME | End: 2024-10-21
Payer: MEDICARE

## 2024-10-21 DIAGNOSIS — Z87.891 HISTORY OF TOBACCO ABUSE: ICD-10-CM

## 2024-10-21 PROCEDURE — 71271 CT THORAX LUNG CANCER SCR C-: CPT | Performed by: RADIOLOGY

## 2024-10-21 PROCEDURE — 71271 CT THORAX LUNG CANCER SCR C-: CPT

## 2024-10-23 NOTE — RESULT ENCOUNTER NOTE
Please let the patient know that her low-dose lung cancer screening CT showed some mild central lobar emphysema.  There were multiple pulmonary nodules but all of them less than 7 mm.  There is moderate to severe coronary artery calcification with a calcium score 493.  2.1 cm hypodense lesion in the right hepatic lobe which is most likely a cyst.  Repeat CT in 6 months to make sure the nodules are stable  With the significant calcification of the coronary arteries I am starting a cholesterol medication.  Have labs done in 6 weeks

## 2024-10-24 ENCOUNTER — TELEPHONE (OUTPATIENT)
Dept: PRIMARY CARE | Facility: CLINIC | Age: 69
End: 2024-10-24
Payer: MEDICARE

## 2024-10-24 NOTE — TELEPHONE ENCOUNTER
----- Message from Nitish Zee sent at 10/23/2024  4:40 PM EDT -----  Please let the patient know that her low-dose lung cancer screening CT showed some mild central lobar emphysema.  There were multiple pulmonary nodules but all of them less than 7 mm.  There is moderate to severe coronary artery calcification with a calcium score 493.  2.1 cm hypodense lesion in the right hepatic lobe which is most likely a cyst.  Repeat CT in 6 months to make sure the nodules are stable  With the significant calcification of the coronary arteries I am starting a cholesterol medication.  Have labs done in 6 weeks

## 2024-11-01 ENCOUNTER — LAB (OUTPATIENT)
Dept: LAB | Facility: LAB | Age: 69
End: 2024-11-01
Payer: MEDICARE

## 2024-11-01 DIAGNOSIS — E53.8 VITAMIN B12 DEFICIENCY: ICD-10-CM

## 2024-11-01 DIAGNOSIS — L29.9 PRURITUS, UNSPECIFIED: ICD-10-CM

## 2024-11-01 DIAGNOSIS — E78.9 BORDERLINE HIGH CHOLESTEROL: ICD-10-CM

## 2024-11-01 DIAGNOSIS — M85.80 OSTEOPENIA, UNSPECIFIED LOCATION: ICD-10-CM

## 2024-11-01 LAB
25(OH)D3 SERPL-MCNC: 28 NG/ML (ref 30–100)
ALBUMIN SERPL BCP-MCNC: 3.9 G/DL (ref 3.4–5)
ALP SERPL-CCNC: 62 U/L (ref 33–136)
ALT SERPL W P-5'-P-CCNC: 15 U/L (ref 7–45)
ANION GAP SERPL CALC-SCNC: 12 MMOL/L (ref 10–20)
AST SERPL W P-5'-P-CCNC: 16 U/L (ref 9–39)
BASOPHILS # BLD AUTO: 0.05 X10*3/UL (ref 0–0.1)
BASOPHILS NFR BLD AUTO: 0.8 %
BILIRUB SERPL-MCNC: 0.5 MG/DL (ref 0–1.2)
BUN SERPL-MCNC: 13 MG/DL (ref 6–23)
CALCIUM SERPL-MCNC: 8.8 MG/DL (ref 8.6–10.3)
CHLORIDE SERPL-SCNC: 102 MMOL/L (ref 98–107)
CHOLEST SERPL-MCNC: 168 MG/DL (ref 0–199)
CHOLESTEROL/HDL RATIO: 3.2
CO2 SERPL-SCNC: 30 MMOL/L (ref 21–32)
CREAT SERPL-MCNC: 0.83 MG/DL (ref 0.5–1.05)
EGFRCR SERPLBLD CKD-EPI 2021: 76 ML/MIN/1.73M*2
EOSINOPHIL # BLD AUTO: 0.18 X10*3/UL (ref 0–0.7)
EOSINOPHIL NFR BLD AUTO: 2.9 %
ERYTHROCYTE [DISTWIDTH] IN BLOOD BY AUTOMATED COUNT: 14.2 % (ref 11.5–14.5)
GLUCOSE SERPL-MCNC: 95 MG/DL (ref 74–99)
HCT VFR BLD AUTO: 44.2 % (ref 36–46)
HDLC SERPL-MCNC: 52.1 MG/DL
HGB BLD-MCNC: 14.4 G/DL (ref 12–16)
IMM GRANULOCYTES # BLD AUTO: 0.01 X10*3/UL (ref 0–0.7)
IMM GRANULOCYTES NFR BLD AUTO: 0.2 % (ref 0–0.9)
LDLC SERPL CALC-MCNC: 95 MG/DL
LYMPHOCYTES # BLD AUTO: 1.54 X10*3/UL (ref 1.2–4.8)
LYMPHOCYTES NFR BLD AUTO: 25 %
MCH RBC QN AUTO: 31.4 PG (ref 26–34)
MCHC RBC AUTO-ENTMCNC: 32.6 G/DL (ref 32–36)
MCV RBC AUTO: 97 FL (ref 80–100)
MONOCYTES # BLD AUTO: 0.62 X10*3/UL (ref 0.1–1)
MONOCYTES NFR BLD AUTO: 10 %
NEUTROPHILS # BLD AUTO: 3.77 X10*3/UL (ref 1.2–7.7)
NEUTROPHILS NFR BLD AUTO: 61.1 %
NON HDL CHOLESTEROL: 116 MG/DL (ref 0–149)
NRBC BLD-RTO: 0 /100 WBCS (ref 0–0)
PLATELET # BLD AUTO: 312 X10*3/UL (ref 150–450)
POTASSIUM SERPL-SCNC: 4.5 MMOL/L (ref 3.5–5.3)
PROT SERPL-MCNC: 5.9 G/DL (ref 6.4–8.2)
RBC # BLD AUTO: 4.58 X10*6/UL (ref 4–5.2)
SODIUM SERPL-SCNC: 139 MMOL/L (ref 136–145)
TRIGL SERPL-MCNC: 104 MG/DL (ref 0–149)
TSH SERPL-ACNC: 1.28 MIU/L (ref 0.44–3.98)
VIT B12 SERPL-MCNC: 325 PG/ML (ref 211–911)
VLDL: 21 MG/DL (ref 0–40)
WBC # BLD AUTO: 6.2 X10*3/UL (ref 4.4–11.3)

## 2024-11-01 PROCEDURE — 85025 COMPLETE CBC W/AUTO DIFF WBC: CPT

## 2024-11-01 PROCEDURE — 80053 COMPREHEN METABOLIC PANEL: CPT

## 2024-11-01 PROCEDURE — 36415 COLL VENOUS BLD VENIPUNCTURE: CPT

## 2024-11-01 PROCEDURE — 82607 VITAMIN B-12: CPT

## 2024-11-01 PROCEDURE — 80061 LIPID PANEL: CPT

## 2024-11-01 PROCEDURE — 82306 VITAMIN D 25 HYDROXY: CPT

## 2024-11-01 PROCEDURE — 84443 ASSAY THYROID STIM HORMONE: CPT

## 2024-11-04 ENCOUNTER — TELEPHONE (OUTPATIENT)
Dept: PRIMARY CARE | Facility: CLINIC | Age: 69
End: 2024-11-04
Payer: MEDICARE

## 2024-11-04 NOTE — TELEPHONE ENCOUNTER
I called Delaney and let her know.   She is asking how did you know that her cholestrol was high. She said you put her on atoravastatin but she didn't get her blood drawn until this past Friday 11-1-24. She has only taken it 2 days because she wasn't sure it was meant for her.

## 2024-11-04 NOTE — TELEPHONE ENCOUNTER
----- Message from Nitish Zee sent at 11/4/2024 12:49 PM EST -----  Please let the patient know that her vitamin D was low at 28.  I would suggest that she take vitamin D 2000 IUs daily with a meal.  The remainder of the labs were normal.

## 2024-12-09 ENCOUNTER — HOSPITAL ENCOUNTER (OUTPATIENT)
Dept: RADIOLOGY | Facility: HOSPITAL | Age: 69
Discharge: HOME | End: 2024-12-09
Payer: MEDICARE

## 2024-12-09 DIAGNOSIS — J42 UNSPECIFIED CHRONIC BRONCHITIS (MULTI): ICD-10-CM

## 2024-12-09 PROCEDURE — 71046 X-RAY EXAM CHEST 2 VIEWS: CPT

## 2024-12-09 PROCEDURE — 71046 X-RAY EXAM CHEST 2 VIEWS: CPT | Performed by: RADIOLOGY

## 2025-03-24 ENCOUNTER — TELEPHONE (OUTPATIENT)
Dept: RADIOLOGY | Facility: HOSPITAL | Age: 70
End: 2025-03-24
Payer: MEDICARE

## 2025-03-24 NOTE — TELEPHONE ENCOUNTER
Call to the patient in an effort to schedule her for her follow up CT of the chest. Spoke with the patient and the patient is scheduled.

## 2025-04-15 ENCOUNTER — APPOINTMENT (OUTPATIENT)
Dept: PRIMARY CARE | Facility: CLINIC | Age: 70
End: 2025-04-15
Payer: MEDICARE

## 2025-04-15 VITALS
WEIGHT: 121 LBS | TEMPERATURE: 97.5 F | BODY MASS INDEX: 22.26 KG/M2 | HEART RATE: 82 BPM | DIASTOLIC BLOOD PRESSURE: 68 MMHG | OXYGEN SATURATION: 98 % | HEIGHT: 62 IN | SYSTOLIC BLOOD PRESSURE: 152 MMHG

## 2025-04-15 DIAGNOSIS — K63.5 POLYP OF COLON, UNSPECIFIED PART OF COLON, UNSPECIFIED TYPE: ICD-10-CM

## 2025-04-15 DIAGNOSIS — E78.9 BORDERLINE HIGH CHOLESTEROL: ICD-10-CM

## 2025-04-15 DIAGNOSIS — D49.1 NEOPLASM OF LARYNX: ICD-10-CM

## 2025-04-15 DIAGNOSIS — J30.1 ALLERGIC RHINITIS DUE TO POLLEN, UNSPECIFIED SEASONALITY: ICD-10-CM

## 2025-04-15 DIAGNOSIS — L30.9 ECZEMA, UNSPECIFIED TYPE: ICD-10-CM

## 2025-04-15 DIAGNOSIS — J37.0 CHRONIC LARYNGITIS: ICD-10-CM

## 2025-04-15 DIAGNOSIS — Z00.00 MEDICARE ANNUAL WELLNESS VISIT, SUBSEQUENT: Primary | ICD-10-CM

## 2025-04-15 DIAGNOSIS — E53.8 VITAMIN B12 DEFICIENCY: ICD-10-CM

## 2025-04-15 DIAGNOSIS — M85.80 OSTEOPENIA, UNSPECIFIED LOCATION: ICD-10-CM

## 2025-04-15 DIAGNOSIS — J43.2 CENTRILOBULAR EMPHYSEMA (MULTI): ICD-10-CM

## 2025-04-15 DIAGNOSIS — Z12.31 BREAST CANCER SCREENING BY MAMMOGRAM: ICD-10-CM

## 2025-04-15 DIAGNOSIS — R93.1 ELEVATED CORONARY ARTERY CALCIUM SCORE: ICD-10-CM

## 2025-04-15 PROCEDURE — G0439 PPPS, SUBSEQ VISIT: HCPCS | Performed by: FAMILY MEDICINE

## 2025-04-15 PROCEDURE — 1123F ACP DISCUSS/DSCN MKR DOCD: CPT | Performed by: FAMILY MEDICINE

## 2025-04-15 PROCEDURE — 1159F MED LIST DOCD IN RCRD: CPT | Performed by: FAMILY MEDICINE

## 2025-04-15 PROCEDURE — 99214 OFFICE O/P EST MOD 30 MIN: CPT | Performed by: FAMILY MEDICINE

## 2025-04-15 PROCEDURE — 1160F RVW MEDS BY RX/DR IN RCRD: CPT | Performed by: FAMILY MEDICINE

## 2025-04-15 PROCEDURE — 3008F BODY MASS INDEX DOCD: CPT | Performed by: FAMILY MEDICINE

## 2025-04-15 PROCEDURE — 1170F FXNL STATUS ASSESSED: CPT | Performed by: FAMILY MEDICINE

## 2025-04-15 RX ORDER — FLUOCINOLONE ACETONIDE 0.11 MG/ML
OIL AURICULAR (OTIC)
COMMUNITY
Start: 2024-12-10

## 2025-04-15 RX ORDER — OMEPRAZOLE 40 MG/1
1 CAPSULE, DELAYED RELEASE ORAL
COMMUNITY
Start: 2025-03-28

## 2025-04-15 RX ORDER — AZELASTINE 1 MG/ML
SPRAY, METERED NASAL
COMMUNITY
Start: 2025-03-28

## 2025-04-15 ASSESSMENT — ACTIVITIES OF DAILY LIVING (ADL)
BATHING: INDEPENDENT
GROCERY_SHOPPING: INDEPENDENT
MANAGING_FINANCES: INDEPENDENT
DOING_HOUSEWORK: INDEPENDENT
DRESSING: INDEPENDENT
TAKING_MEDICATION: INDEPENDENT

## 2025-04-15 ASSESSMENT — PATIENT HEALTH QUESTIONNAIRE - PHQ9
2. FEELING DOWN, DEPRESSED OR HOPELESS: NOT AT ALL
SUM OF ALL RESPONSES TO PHQ9 QUESTIONS 1 AND 2: 0
1. LITTLE INTEREST OR PLEASURE IN DOING THINGS: NOT AT ALL

## 2025-04-15 NOTE — PROGRESS NOTES
Subjective   Patient ID: Delaney Novoa is a 69 y.o. female who presents for Follow-up (Follow up on losing her voice went to ENT- didn't do much for her. He recommended her to dr. Roy. ) and Medicare Annual Wellness Visit Subsequent.    Past Medical, Surgical, and Family History reviewed and updated in chart.    Reviewed all medications by prescribing practitioner or clinical pharmacist (such as prescriptions, OTCs, herbal therapies and supplements) and documented in the medical record.    HPI  1.  Physical exam.  Pap: last done   Mammogram: last done 11/2023  Colonoscopy: positive cologuard 2021-last colonoscopy 2022, 3 year clearance  Immunizations: High dose flu, shingles  Labs needed, currently pending  Low dose CT scan    Review of Systems  All pertinent positive symptoms are included in the history of present illness.    All other systems have been reviewed and are negative and noncontributory to this patient's current ailments.    History reviewed. No pertinent past medical history.  Past Surgical History:   Procedure Laterality Date   • OTHER SURGICAL HISTORY  09/09/2019    Kidney surgery   • OTHER SURGICAL HISTORY  12/27/2021    Carpal tunnel surgery     Social History     Tobacco Use   • Smoking status: Former     Current packs/day: 0.50     Average packs/day: 0.5 packs/day for 50.3 years (25.1 ttl pk-yrs)     Types: Cigarettes     Start date: 1/6/1975   • Smokeless tobacco: Never   Substance Use Topics   • Alcohol use: Yes   • Drug use: Never     Family History   Problem Relation Name Age of Onset   • Diabetes Mother     • Stroke Father     • Diabetes Father     • Heart attack Father     • Diabetes Sister     • Heart attack Brother       Immunization History   Administered Date(s) Administered   • Flu vaccine, quadrivalent, high-dose, preservative free, age 65y+ (FLUZONE) 10/05/2023   • Flu vaccine, trivalent, preservative free, HIGH-DOSE, age 65y+ (Fluzone) 10/08/2024   • Influenza, seasonal,  "injectable 09/07/2022   • Marcos SARS-CoV-2 Vaccination 10/22/2021   • Pneumococcal conjugate vaccine, 13-valent (PREVNAR 13) 10/27/2021   • Pneumococcal conjugate vaccine, 20-valent (PREVNAR 20) 04/05/2023     Current Outpatient Medications   Medication Instructions   • atorvastatin (LIPITOR) 20 mg, oral, Daily   • azelastine (Astelin) 137 mcg (0.1 %) nasal spray BLOW THE NOSE GENTLY. SHAKE INHALER WELL BEFORE USE & THEN SPRAY TWO (2) TIMES IN EACH NOSTRIL TWO (2) TIMES DAILY.   • cyanocobalamin (VITAMIN B-12) 1,000 mcg, Once   • fluocinolone (DermOtic) 0.01 % ear drops INSTILL 5 DROPS INTO THE AFFECTED EAR(S) DAILY AS NEEDED FOR ITCHING   • fluticasone (Flonase) 50 mcg/actuation nasal spray 1 spray, Each Nostril, Daily, Shake gently. Before first use, prime pump. After use, clean tip and replace cap.   • multivitamin with minerals (multivitamin-iron-folic acid) tablet Take by mouth.   • mv,jessica,min/iron/folic acid/lut (COMPLETE MULTI ORAL) Take by mouth.   • omeprazole (PriLOSEC) 40 mg DR capsule 1 capsule, Daily (0630)   • vit A/vit C/vit E/zinc/copper (PRESERVISION AREDS ORAL) Take by mouth.     Allergies   Allergen Reactions   • Codeine Unknown       Objective   Vitals:    04/15/25 0813   BP: 152/68   BP Location: Left arm   Patient Position: Sitting   BP Cuff Size: Adult   Pulse: 82   Temp: 36.4 °C (97.5 °F)   SpO2: 98%   Weight: 54.9 kg (121 lb)   Height: 1.575 m (5' 2\")       Body mass index is 22.13 kg/m².    BP Readings from Last 3 Encounters:   04/15/25 152/68   10/08/24 128/76   10/05/23 116/80      Wt Readings from Last 3 Encounters:   04/15/25 54.9 kg (121 lb)   10/08/24 51.7 kg (114 lb)   10/05/23 51.7 kg (114 lb)        No visits with results within 1 Month(s) from this visit.   Latest known visit with results is:   Lab on 11/01/2024   Component Date Value   • WBC 11/01/2024 6.2    • nRBC 11/01/2024 0.0    • RBC 11/01/2024 4.58    • Hemoglobin 11/01/2024 14.4    • Hematocrit 11/01/2024 44.2    • MCV " 11/01/2024 97    • MCH 11/01/2024 31.4    • MCHC 11/01/2024 32.6    • RDW 11/01/2024 14.2    • Platelets 11/01/2024 312    • Neutrophils % 11/01/2024 61.1    • Immature Granulocytes %,* 11/01/2024 0.2    • Lymphocytes % 11/01/2024 25.0    • Monocytes % 11/01/2024 10.0    • Eosinophils % 11/01/2024 2.9    • Basophils % 11/01/2024 0.8    • Neutrophils Absolute 11/01/2024 3.77    • Immature Granulocytes Ab* 11/01/2024 0.01    • Lymphocytes Absolute 11/01/2024 1.54    • Monocytes Absolute 11/01/2024 0.62    • Eosinophils Absolute 11/01/2024 0.18    • Basophils Absolute 11/01/2024 0.05    • Glucose 11/01/2024 95    • Sodium 11/01/2024 139    • Potassium 11/01/2024 4.5    • Chloride 11/01/2024 102    • Bicarbonate 11/01/2024 30    • Anion Gap 11/01/2024 12    • Urea Nitrogen 11/01/2024 13    • Creatinine 11/01/2024 0.83    • eGFR 11/01/2024 76    • Calcium 11/01/2024 8.8    • Albumin 11/01/2024 3.9    • Alkaline Phosphatase 11/01/2024 62    • Total Protein 11/01/2024 5.9 (L)    • AST 11/01/2024 16    • Bilirubin, Total 11/01/2024 0.5    • ALT 11/01/2024 15    • Cholesterol 11/01/2024 168    • HDL-Cholesterol 11/01/2024 52.1    • Cholesterol/HDL Ratio 11/01/2024 3.2    • LDL Calculated 11/01/2024 95    • VLDL 11/01/2024 21    • Triglycerides 11/01/2024 104    • Non HDL Cholesterol 11/01/2024 116    • Vitamin B12 11/01/2024 325    • Vitamin D, 25-Hydroxy, T* 11/01/2024 28 (L)    • Thyroid Stimulating Horm* 11/01/2024 1.28      Physical Exam  General: Pt is sitting up in chair. Appears well-nourished. In no acute distress.  HENT: Cerumen impaction right ear.  Left canal patent.  Oropharynx without erythema or exudate.  Neck: No cervical lymphadenopathy. No thyromegaly. No carotid bruits.  CV: S1S2 normal. Regular rate and rhythm. No murmurs, rubs, or gallops.  Resp: Clear to auscultation in all lobes. Good aeration. No rales, rhonchi, or wheezes.  GI: Soft, no tenderness to palpation. No organomegaly. No abdominal bruits.    Extremities: No lower extremity edema bilaterally.   Skin: Warm and dry. No rashes or bruising.   Psych: Appropriate responses and actions.       Assessment/Plan   Problem List Items Addressed This Visit    None         Reviewed in for the patient's past medical history, surgical history, family history, social history  Depression screening was done in the office today using PHQ-2  We reviewed the patient's activities of daily living and possible risk for falling. Patient is stable.  Discussed preventative screenings  Vaccinations were discussed in the office. We did review the patient's status on influenza vaccination, pneumonia vaccination, tetanus vaccination, and given Prevnar 20 2023.  HD Influenza given today  Patient was instructed to follow-up with dentist regularly and also with eye doctor regularly  We discussed advanced directives including power of , living well and DO NOT RESUSCITATE orders    1.  GERD  - Omeprazole 40 mg 1 p.o. daily    2.  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 Restrepo sent her to Dr. Roy    3.  Allergic rhinitis  - Fluticasone nasal spray 2 sprays daily  - As Astelin 137 mcg 2 sprays twice a day    4.  Chronic hoarseness    5.  Chronic bronchitis  - Treated in December with Medrol Dosepak and cefdinir    6.  Eczema  - For the ear if she is on fluocinolone oil 0.01% eardrops-5 drops in the affected ear daily as needed for itching  -Dermatology is following and providing Dupixent    7.  History of tobacco use and multiple pulmonary nodules  Low-dose lung cancer screening  -Please let the patient know that her low-dose lung cancer screening CT showed some mild central lobar emphysema.  There were multiple pulmonary nodules but all of them less than 7 mm.  There is moderate to severe coronary artery calcification with a calcium score 493.  2.1 cm hypodense lesion in the right hepatic lobe which is most likely a cyst.  The patient was supposed to  schedule and repeat in March 2025.  Patient is scheduled and needs to reschedule due to conflicting appointments.       8.  Elevated coronary artery calcium score  - Coronary artery calcium score 493  - Had started atorvastatin 20 mg daily    9.  Central lobar emphysema  -Pulmonary function test ordered for the future due to her having so many appt.     10.  Preventive  - Colonoscopy February 9 2022-3-year follow-up due to polyps  - Mammogram (gets done at GYN)- mammogram November 2023-recommended ultrasound due to dense breasts.  Mammogram was negative  - Bone density-has osteopenia and followed Adam Hernandez in [place] her GYN  - Recommend RSV and Shingrix    11.  Vitamin D deficiency  - Patient was instructed to take 2000 IUs of vitamin D daily    12.  Colonic polyps  - Ascending colon-tubular adenoma  - Descending colon-tubular adenoma  - Rectum-hyperplastic polyp

## 2025-04-28 ENCOUNTER — APPOINTMENT (OUTPATIENT)
Dept: RADIOLOGY | Facility: CLINIC | Age: 70
End: 2025-04-28
Payer: MEDICARE

## 2025-04-28 ENCOUNTER — OFFICE VISIT (OUTPATIENT)
Dept: OTOLARYNGOLOGY | Facility: HOSPITAL | Age: 70
End: 2025-04-28
Payer: MEDICARE

## 2025-04-28 VITALS — WEIGHT: 118 LBS | BODY MASS INDEX: 21.71 KG/M2 | TEMPERATURE: 97.9 F | HEIGHT: 62 IN

## 2025-04-28 DIAGNOSIS — J38.7 LEUKOPLAKIA OF LARYNX: ICD-10-CM

## 2025-04-28 DIAGNOSIS — J38.3 VOCAL CORD MASS: Primary | ICD-10-CM

## 2025-04-28 DIAGNOSIS — R09.A2 GLOBUS PHARYNGEUS: ICD-10-CM

## 2025-04-28 DIAGNOSIS — Z01.818 PRE-OPERATIVE CLEARANCE: ICD-10-CM

## 2025-04-28 DIAGNOSIS — R49.8 OTHER VOICE AND RESONANCE DISORDERS: ICD-10-CM

## 2025-04-28 DIAGNOSIS — J38.4 LARYNX EDEMA: ICD-10-CM

## 2025-04-28 DIAGNOSIS — R49.0 VOICE HOARSENESS: ICD-10-CM

## 2025-04-28 DIAGNOSIS — D14.1 LARYNX NEOPLASM BENIGN: ICD-10-CM

## 2025-04-28 DIAGNOSIS — R49.0 MUSCLE TENSION DYSPHONIA: ICD-10-CM

## 2025-04-28 DIAGNOSIS — R49.8 VOICE FATIGUE: ICD-10-CM

## 2025-04-28 PROCEDURE — 99214 OFFICE O/P EST MOD 30 MIN: CPT | Mod: 25 | Performed by: OTOLARYNGOLOGY

## 2025-04-28 PROCEDURE — 31575 DIAGNOSTIC LARYNGOSCOPY: CPT | Performed by: OTOLARYNGOLOGY

## 2025-04-28 PROCEDURE — 1123F ACP DISCUSS/DSCN MKR DOCD: CPT | Performed by: OTOLARYNGOLOGY

## 2025-04-28 PROCEDURE — 3008F BODY MASS INDEX DOCD: CPT | Performed by: OTOLARYNGOLOGY

## 2025-04-28 PROCEDURE — 1159F MED LIST DOCD IN RCRD: CPT | Performed by: OTOLARYNGOLOGY

## 2025-04-28 PROCEDURE — 99204 OFFICE O/P NEW MOD 45 MIN: CPT | Performed by: OTOLARYNGOLOGY

## 2025-04-28 ASSESSMENT — PATIENT HEALTH QUESTIONNAIRE - PHQ9
SUM OF ALL RESPONSES TO PHQ9 QUESTIONS 1 & 2: 0
2. FEELING DOWN, DEPRESSED OR HOPELESS: NOT AT ALL
1. LITTLE INTEREST OR PLEASURE IN DOING THINGS: NOT AT ALL

## 2025-04-28 NOTE — PROGRESS NOTES
"  ASSESSMENT AND PLAN:   Delaney Novoa is a 70 y.o. 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  None of this looks ominous but there is leukoplakia and FVC fullness.    Recommend CT with contrast with fine cuts through the larynx, as well as procedure for evaluation in OR at a time convenient to the patient. Schedule supraglottoplasty as patient wants a pathologic diagnosis with biopsy with CO2 and KTP Lasers available.     Follow-up with me for procedure after CT results are back.          Reason For Consult  Chief Complaint   Patient presents with    Follow-up     Referred by PCP Dr. Nitish Zee for chronic hoarseness.      HISTORY OF PRESENT ILLNESS:  Delaney Novoa is a 70 y.o. female presenting for an initial visit with me for chronic hoarseness and neoplasm of the larynx - ref by Dr. Restrepo    In January 2024: Reported a cold, chest congestion and coughing. Her coughing has stuck around and her voice comes and goes, sinus pressure/pain, with congestion. She has tried some OTC cough syrup and it's helped a little. She feels like she is improving but still has a hoarse voice. Voice concerns for >1 year.     The patient reports voice problems for >1 year. She was sick in January 2024 and she was prescribed antibiotics by her PCP. She did not find that it helped. She relates that she woke and found her forehead red and throat was \"like a frog.\" She called the PCP who recommended Benadryl. Dn appear to be related to the voice    The patient was a smoker 30 py about 1/2 pack pd. She states she quit smoking ~1 year ago.     Ringing in right ear. White cerumen right>left tried nasal spray dn help with the voice. She denies use of Q-tips in her ears.          Past Medical History  She " "has no past medical history on file. Surgical History  She has a past surgical history that includes Other surgical history (09/09/2019) and Other surgical history (12/27/2021).   Social History  She reports that she has quit smoking. Her smoking use included cigarettes. She started smoking about 50 years ago. She has a 25.2 pack-year smoking history. She has never used smokeless tobacco. She reports current alcohol use. She reports that she does not use drugs. Allergies  Codeine     Family History  Family History[1]     Review of Systems  All 10 systems were reviewed and negative except for above.      Physical Exam    ENT Physical Exam   ENT Physical Exam  Constitutional  Appearance: patient appears well-developed and well-nourished,  Head and Face  Appearance: head appears normal and face appears normal;  Ear  Auricles: right auricle normal; left auricle normal;  Nose  External Nose: nares patent bilaterally;  Oral Cavity/Oropharynx  Lips: normal; fully edentulous with upper and lower dentures  Neck  Neck: neck normal; neck palpation normal;  Respiratory  Inspection: no retractions visible;  Cardiovascular  Inspection: no peripheral edema present;  Neurovestibular  Mental Status: alert and oriented;  Psychiatric: mood normal;  Cranial Nerves: cranial nerves intact;    Last Recorded Vitals  Temperature 36.6 °C (97.9 °F), temperature source Temporal, height 1.575 m (5' 2\"), weight 53.5 kg (118 lb).    Relevant Results       ----------------------------------------------------------------------  Procedures   Flexible Laryngoscopy w/ Videostroboscopy    VOICE AND SPEECH CHARACTERISTICS:  Normal spoken speech, (+) severe dysphonia, (+) severe roughness, (+) severe breathiness, (+) severe asthenia, (+) severe strain.    Intelligibility: reduced.   Resonance: balanced.   Vocal Loudness: reduced.   Breath Support: poor coordination.    PROCEDURE:    Indications: voice change  Procedure Note  Endoscopy Type:  Flexible " Laryngoscopy    Procedure Details:    The patient was placed in the sitting position.  After topical anesthesia and decongestion, the 4 mm laryngoscope was passed.  The nasal cavities, nasopharynx, oropharynx, hypopharynx, and larynx were all examined.  Vocal cords were examined during respiration and phonation.  The following findings were noted:  Condition:  Stable.  Patient tolerated procedure well.    Complications:  None  Patient is seated in the exam chair. After adequate topical anesthesia, I advance the flexible endoscope. The examination included evaluation of the herndon, vallecula, base of tongue, pyriforms, post-cricoid area, larynx and immediate subglottis.    Reflux Finding Score  Subglottic edema: Present  Thick Mucus: Present  Granuloma: Absent   Ventricular Obliteration: Complete  Erythema/Hyperemia: Absent   IA Thickening: Mild   TVC Edema: Severe  Diffuse Laryngitis: Moderate     Gross Arytenoid Movement: limited adduction bilateral.  Arytenoid Height: normal.   Supraglottic Tension: lateral.    Closure: irregular.      Time Spent  Prep time on day of patient encounter: 5-10 minutes  Time spent directly with patient, family or caregiver: 25 minutes  Additional Time Spent on Patient Care Activities/discuss care plan with SLP: 5 minutes  Documentation Time: 10 minutes  Other Time Spent: 0 minutes  Total: 50 minutes     Scribe Attestation  By signing my name below, IElicia , [employer]   attest that this documentation has been prepared under the direction and in the presence of Ted Roy MD.         [1]   Family History  Problem Relation Name Age of Onset    Diabetes Mother      Stroke Father      Diabetes Father      Heart attack Father      Diabetes Sister      Heart attack Brother

## 2025-05-01 ENCOUNTER — HOSPITAL ENCOUNTER (OUTPATIENT)
Dept: RADIOLOGY | Facility: CLINIC | Age: 70
Discharge: HOME | End: 2025-05-01
Payer: MEDICARE

## 2025-05-01 DIAGNOSIS — R91.8 PULMONARY NODULES: ICD-10-CM

## 2025-05-01 PROCEDURE — 71250 CT THORAX DX C-: CPT

## 2025-05-02 DIAGNOSIS — J38.3 VOCAL CORD MASS: ICD-10-CM

## 2025-05-08 DIAGNOSIS — R91.8 PULMONARY NODULES: Primary | ICD-10-CM

## 2025-05-08 DIAGNOSIS — Z87.891 HISTORY OF TOBACCO ABUSE: ICD-10-CM

## 2025-05-10 LAB
ALBUMIN SERPL-MCNC: 4.4 G/DL (ref 3.6–5.1)
ALP SERPL-CCNC: 86 U/L (ref 37–153)
ALT SERPL-CCNC: 19 U/L (ref 6–29)
ANION GAP SERPL CALCULATED.4IONS-SCNC: 9 MMOL/L (CALC) (ref 7–17)
AST SERPL-CCNC: 21 U/L (ref 10–35)
BASOPHILS # BLD AUTO: 50 CELLS/UL (ref 0–200)
BASOPHILS NFR BLD AUTO: 0.8 %
BILIRUB SERPL-MCNC: 0.4 MG/DL (ref 0.2–1.2)
BUN SERPL-MCNC: 15 MG/DL (ref 7–25)
CALCIUM SERPL-MCNC: 9.3 MG/DL (ref 8.6–10.4)
CHLORIDE SERPL-SCNC: 102 MMOL/L (ref 98–110)
CHOLEST SERPL-MCNC: 162 MG/DL
CHOLEST/HDLC SERPL: 2.7 (CALC)
CO2 SERPL-SCNC: 29 MMOL/L (ref 20–32)
CREAT SERPL-MCNC: 0.75 MG/DL (ref 0.6–1)
EGFRCR SERPLBLD CKD-EPI 2021: 86 ML/MIN/1.73M2
EOSINOPHIL # BLD AUTO: 151 CELLS/UL (ref 15–500)
EOSINOPHIL NFR BLD AUTO: 2.4 %
ERYTHROCYTE [DISTWIDTH] IN BLOOD BY AUTOMATED COUNT: 12.5 % (ref 11–15)
GLUCOSE SERPL-MCNC: 118 MG/DL (ref 65–139)
HCT VFR BLD AUTO: 42.8 % (ref 35–45)
HDLC SERPL-MCNC: 60 MG/DL
HGB BLD-MCNC: 13.9 G/DL (ref 11.7–15.5)
LDLC SERPL CALC-MCNC: 85 MG/DL (CALC)
LYMPHOCYTES # BLD AUTO: 1229 CELLS/UL (ref 850–3900)
LYMPHOCYTES NFR BLD AUTO: 19.5 %
MCH RBC QN AUTO: 31 PG (ref 27–33)
MCHC RBC AUTO-ENTMCNC: 32.5 G/DL (ref 32–36)
MCV RBC AUTO: 95.5 FL (ref 80–100)
MONOCYTES # BLD AUTO: 586 CELLS/UL (ref 200–950)
MONOCYTES NFR BLD AUTO: 9.3 %
NEUTROPHILS # BLD AUTO: 4284 CELLS/UL (ref 1500–7800)
NEUTROPHILS NFR BLD AUTO: 68 %
NONHDLC SERPL-MCNC: 102 MG/DL (CALC)
PLATELET # BLD AUTO: 359 THOUSAND/UL (ref 140–400)
PMV BLD REES-ECKER: 9.7 FL (ref 7.5–12.5)
POTASSIUM SERPL-SCNC: 4.2 MMOL/L (ref 3.5–5.3)
PROT SERPL-MCNC: 6.6 G/DL (ref 6.1–8.1)
RBC # BLD AUTO: 4.48 MILLION/UL (ref 3.8–5.1)
SODIUM SERPL-SCNC: 140 MMOL/L (ref 135–146)
TRIGL SERPL-MCNC: 78 MG/DL
VIT B12 SERPL-MCNC: 341 PG/ML (ref 200–1100)
WBC # BLD AUTO: 6.3 THOUSAND/UL (ref 3.8–10.8)

## 2025-05-28 ENCOUNTER — HOSPITAL ENCOUNTER (OUTPATIENT)
Dept: RADIOLOGY | Facility: HOSPITAL | Age: 70
Discharge: HOME | End: 2025-05-28
Payer: MEDICARE

## 2025-05-28 ENCOUNTER — HOSPITAL ENCOUNTER (OUTPATIENT)
Dept: CARDIOLOGY | Facility: HOSPITAL | Age: 70
Discharge: HOME | End: 2025-05-28
Payer: MEDICARE

## 2025-05-28 DIAGNOSIS — J38.3 VOCAL CORD MASS: ICD-10-CM

## 2025-05-28 DIAGNOSIS — Z01.818 PRE-OPERATIVE CLEARANCE: ICD-10-CM

## 2025-05-28 PROCEDURE — 2550000001 HC RX 255 CONTRASTS: Mod: JZ | Performed by: OTOLARYNGOLOGY

## 2025-05-28 PROCEDURE — 70491 CT SOFT TISSUE NECK W/DYE: CPT

## 2025-05-28 PROCEDURE — 93005 ELECTROCARDIOGRAM TRACING: CPT

## 2025-05-28 RX ADMIN — IOHEXOL 75 ML: 350 INJECTION, SOLUTION INTRAVENOUS at 10:19

## 2025-05-29 LAB
ATRIAL RATE: 66 BPM
P AXIS: 58 DEGREES
PR INTERVAL: 206 MS
Q ONSET: 253 MS
QRS COUNT: 11 BEATS
QRS DURATION: 91 MS
QT INTERVAL: 405 MS
QTC CALCULATION(BAZETT): 425 MS
QTC FREDERICIA: 418 MS
R AXIS: 43 DEGREES
T AXIS: 65 DEGREES
T OFFSET: 455 MS
VENTRICULAR RATE: 66 BPM

## 2025-06-10 ENCOUNTER — ANESTHESIA EVENT (OUTPATIENT)
Dept: OPERATING ROOM | Facility: HOSPITAL | Age: 70
End: 2025-06-10
Payer: MEDICARE

## 2025-06-10 ASSESSMENT — ENCOUNTER SYMPTOMS
CARDIOVASCULAR NEGATIVE: 1
PSYCHIATRIC NEGATIVE: 1
FEVER: 0

## 2025-06-11 ENCOUNTER — HOSPITAL ENCOUNTER (OUTPATIENT)
Facility: HOSPITAL | Age: 70
Setting detail: OUTPATIENT SURGERY
Discharge: HOME | End: 2025-06-11
Attending: OTOLARYNGOLOGY | Admitting: OTOLARYNGOLOGY
Payer: MEDICARE

## 2025-06-11 ENCOUNTER — ANESTHESIA (OUTPATIENT)
Dept: OPERATING ROOM | Facility: HOSPITAL | Age: 70
End: 2025-06-11
Payer: MEDICARE

## 2025-06-11 VITALS
DIASTOLIC BLOOD PRESSURE: 72 MMHG | HEART RATE: 75 BPM | HEIGHT: 62 IN | TEMPERATURE: 97.5 F | WEIGHT: 116.62 LBS | OXYGEN SATURATION: 100 % | BODY MASS INDEX: 21.46 KG/M2 | RESPIRATION RATE: 16 BRPM | SYSTOLIC BLOOD PRESSURE: 156 MMHG

## 2025-06-11 DIAGNOSIS — G89.18 POST-OP PAIN: Primary | ICD-10-CM

## 2025-06-11 DIAGNOSIS — K21.9 GASTROESOPHAGEAL REFLUX DISEASE WITHOUT ESOPHAGITIS: ICD-10-CM

## 2025-06-11 DIAGNOSIS — J38.3 VOCAL CORD MASS: ICD-10-CM

## 2025-06-11 PROBLEM — J44.9 CHRONIC OBSTRUCTIVE PULMONARY DISEASE (MULTI): Status: ACTIVE | Noted: 2025-06-11

## 2025-06-11 PROCEDURE — 88305 TISSUE EXAM BY PATHOLOGIST: CPT | Performed by: STUDENT IN AN ORGANIZED HEALTH CARE EDUCATION/TRAINING PROGRAM

## 2025-06-11 PROCEDURE — 7100000010 HC PHASE TWO TIME - EACH INCREMENTAL 1 MINUTE: Performed by: OTOLARYNGOLOGY

## 2025-06-11 PROCEDURE — 3700000002 HC GENERAL ANESTHESIA TIME - EACH INCREMENTAL 1 MINUTE: Performed by: OTOLARYNGOLOGY

## 2025-06-11 PROCEDURE — 2500000004 HC RX 250 GENERAL PHARMACY W/ HCPCS (ALT 636 FOR OP/ED)

## 2025-06-11 PROCEDURE — 2500000004 HC RX 250 GENERAL PHARMACY W/ HCPCS (ALT 636 FOR OP/ED): Performed by: ANESTHESIOLOGY

## 2025-06-11 PROCEDURE — 31545 REMOVE VC LESION W/SCOPE: CPT | Performed by: OTOLARYNGOLOGY

## 2025-06-11 PROCEDURE — 3700000001 HC GENERAL ANESTHESIA TIME - INITIAL BASE CHARGE: Performed by: OTOLARYNGOLOGY

## 2025-06-11 PROCEDURE — A31571 PR LARYNGOSCOPY,DIRECT,SCOPE,INJ CORDS

## 2025-06-11 PROCEDURE — 7100000001 HC RECOVERY ROOM TIME - INITIAL BASE CHARGE: Performed by: OTOLARYNGOLOGY

## 2025-06-11 PROCEDURE — 2500000001 HC RX 250 WO HCPCS SELF ADMINISTERED DRUGS (ALT 637 FOR MEDICARE OP): Performed by: ANESTHESIOLOGY

## 2025-06-11 PROCEDURE — 2500000005 HC RX 250 GENERAL PHARMACY W/O HCPCS: Performed by: OTOLARYNGOLOGY

## 2025-06-11 PROCEDURE — 31622 DX BRONCHOSCOPE/WASH: CPT | Performed by: OTOLARYNGOLOGY

## 2025-06-11 PROCEDURE — 7100000009 HC PHASE TWO TIME - INITIAL BASE CHARGE: Performed by: OTOLARYNGOLOGY

## 2025-06-11 PROCEDURE — 31599 UNLISTED PROCEDURE LARYNX: CPT | Performed by: OTOLARYNGOLOGY

## 2025-06-11 PROCEDURE — 31571 LARYNGOSCOP W/VC INJ + SCOPE: CPT | Performed by: OTOLARYNGOLOGY

## 2025-06-11 PROCEDURE — 31561 LARYNSCOP REMVE CART + SCOP: CPT | Performed by: OTOLARYNGOLOGY

## 2025-06-11 PROCEDURE — 88307 TISSUE EXAM BY PATHOLOGIST: CPT | Performed by: STUDENT IN AN ORGANIZED HEALTH CARE EDUCATION/TRAINING PROGRAM

## 2025-06-11 PROCEDURE — A31571 PR LARYNGOSCOPY,DIRECT,SCOPE,INJ CORDS: Performed by: ANESTHESIOLOGY

## 2025-06-11 PROCEDURE — 88307 TISSUE EXAM BY PATHOLOGIST: CPT | Mod: TC,SUR | Performed by: OTOLARYNGOLOGY

## 2025-06-11 PROCEDURE — 2500000004 HC RX 250 GENERAL PHARMACY W/ HCPCS (ALT 636 FOR OP/ED): Performed by: OTOLARYNGOLOGY

## 2025-06-11 PROCEDURE — 7100000002 HC RECOVERY ROOM TIME - EACH INCREMENTAL 1 MINUTE: Performed by: OTOLARYNGOLOGY

## 2025-06-11 PROCEDURE — 3600000007 HC OR TIME - EACH INCREMENTAL 1 MINUTE - PROCEDURE LEVEL TWO: Performed by: OTOLARYNGOLOGY

## 2025-06-11 PROCEDURE — 2720000007 HC OR 272 NO HCPCS: Performed by: OTOLARYNGOLOGY

## 2025-06-11 PROCEDURE — 3600000002 HC OR TIME - INITIAL BASE CHARGE - PROCEDURE LEVEL TWO: Performed by: OTOLARYNGOLOGY

## 2025-06-11 RX ORDER — PROPOFOL 10 MG/ML
INJECTION, EMULSION INTRAVENOUS AS NEEDED
Status: DISCONTINUED | OUTPATIENT
Start: 2025-06-11 | End: 2025-06-11

## 2025-06-11 RX ORDER — REMIFENTANIL HYDROCHLORIDE 1 MG/ML
INJECTION, POWDER, LYOPHILIZED, FOR SOLUTION INTRAVENOUS AS NEEDED
Status: DISCONTINUED | OUTPATIENT
Start: 2025-06-11 | End: 2025-06-11

## 2025-06-11 RX ORDER — CIPROFLOXACIN 500 MG/1
500 TABLET, FILM COATED ORAL 2 TIMES DAILY
Qty: 20 TABLET | Refills: 0 | Status: SHIPPED | OUTPATIENT
Start: 2025-06-11 | End: 2025-06-21

## 2025-06-11 RX ORDER — LIDOCAINE HYDROCHLORIDE 10 MG/ML
0.1 INJECTION, SOLUTION INFILTRATION; PERINEURAL ONCE
Status: DISCONTINUED | OUTPATIENT
Start: 2025-06-11 | End: 2025-06-11 | Stop reason: HOSPADM

## 2025-06-11 RX ORDER — OXYCODONE HYDROCHLORIDE 5 MG/1
5 TABLET ORAL EVERY 4 HOURS PRN
Status: DISCONTINUED | OUTPATIENT
Start: 2025-06-11 | End: 2025-06-11 | Stop reason: HOSPADM

## 2025-06-11 RX ORDER — SODIUM CHLORIDE 0.9 G/100ML
INJECTION, SOLUTION IRRIGATION AS NEEDED
Status: DISCONTINUED | OUTPATIENT
Start: 2025-06-11 | End: 2025-06-11 | Stop reason: HOSPADM

## 2025-06-11 RX ORDER — LIDOCAINE HCL/PF 100 MG/5ML
SYRINGE (ML) INTRAVENOUS AS NEEDED
Status: DISCONTINUED | OUTPATIENT
Start: 2025-06-11 | End: 2025-06-11

## 2025-06-11 RX ORDER — MIDAZOLAM HYDROCHLORIDE 1 MG/ML
INJECTION INTRAMUSCULAR; INTRAVENOUS AS NEEDED
Status: DISCONTINUED | OUTPATIENT
Start: 2025-06-11 | End: 2025-06-11

## 2025-06-11 RX ORDER — MAG CARB/ALUMINUM HYDROX/ALGIN 358-95/15
5 SUSPENSION, ORAL (FINAL DOSE FORM) ORAL EVERY 6 HOURS PRN
Qty: 355 ML | Refills: 0 | Status: SHIPPED | OUTPATIENT
Start: 2025-06-11 | End: 2025-06-25

## 2025-06-11 RX ORDER — HYDROMORPHONE HYDROCHLORIDE 0.2 MG/ML
0.2 INJECTION INTRAMUSCULAR; INTRAVENOUS; SUBCUTANEOUS EVERY 5 MIN PRN
Status: DISCONTINUED | OUTPATIENT
Start: 2025-06-11 | End: 2025-06-11 | Stop reason: HOSPADM

## 2025-06-11 RX ORDER — ROCURONIUM BROMIDE 10 MG/ML
INJECTION, SOLUTION INTRAVENOUS AS NEEDED
Status: DISCONTINUED | OUTPATIENT
Start: 2025-06-11 | End: 2025-06-11

## 2025-06-11 RX ORDER — ESMOLOL HYDROCHLORIDE 10 MG/ML
INJECTION INTRAVENOUS AS NEEDED
Status: DISCONTINUED | OUTPATIENT
Start: 2025-06-11 | End: 2025-06-11

## 2025-06-11 RX ORDER — ONDANSETRON HYDROCHLORIDE 2 MG/ML
INJECTION, SOLUTION INTRAVENOUS AS NEEDED
Status: DISCONTINUED | OUTPATIENT
Start: 2025-06-11 | End: 2025-06-11

## 2025-06-11 RX ORDER — EPINEPHRINE 1 MG/ML
INJECTION, SOLUTION, CONCENTRATE INTRAVENOUS AS NEEDED
Status: DISCONTINUED | OUTPATIENT
Start: 2025-06-11 | End: 2025-06-11 | Stop reason: HOSPADM

## 2025-06-11 RX ORDER — ACETAMINOPHEN 325 MG/1
650 TABLET ORAL EVERY 4 HOURS PRN
Status: DISCONTINUED | OUTPATIENT
Start: 2025-06-11 | End: 2025-06-11 | Stop reason: HOSPADM

## 2025-06-11 RX ORDER — FENTANYL CITRATE 50 UG/ML
INJECTION, SOLUTION INTRAMUSCULAR; INTRAVENOUS CONTINUOUS PRN
Status: DISCONTINUED | OUTPATIENT
Start: 2025-06-11 | End: 2025-06-11

## 2025-06-11 RX ORDER — ONDANSETRON HYDROCHLORIDE 2 MG/ML
4 INJECTION, SOLUTION INTRAVENOUS ONCE AS NEEDED
Status: DISCONTINUED | OUTPATIENT
Start: 2025-06-11 | End: 2025-06-11 | Stop reason: HOSPADM

## 2025-06-11 RX ORDER — TRAMADOL HYDROCHLORIDE 50 MG/1
50 TABLET, FILM COATED ORAL EVERY 8 HOURS PRN
Qty: 9 TABLET | Refills: 0 | Status: SHIPPED | OUTPATIENT
Start: 2025-06-11 | End: 2025-06-14

## 2025-06-11 RX ORDER — LABETALOL HYDROCHLORIDE 5 MG/ML
INJECTION, SOLUTION INTRAVENOUS AS NEEDED
Status: DISCONTINUED | OUTPATIENT
Start: 2025-06-11 | End: 2025-06-11

## 2025-06-11 RX ORDER — SODIUM CHLORIDE, SODIUM LACTATE, POTASSIUM CHLORIDE, CALCIUM CHLORIDE 600; 310; 30; 20 MG/100ML; MG/100ML; MG/100ML; MG/100ML
50 INJECTION, SOLUTION INTRAVENOUS CONTINUOUS
Status: DISCONTINUED | OUTPATIENT
Start: 2025-06-11 | End: 2025-06-11 | Stop reason: HOSPADM

## 2025-06-11 RX ORDER — DEXAMETHASONE SODIUM PHOSPHATE 10 MG/ML
INJECTION INTRAMUSCULAR; INTRAVENOUS AS NEEDED
Status: DISCONTINUED | OUTPATIENT
Start: 2025-06-11 | End: 2025-06-11 | Stop reason: HOSPADM

## 2025-06-11 RX ORDER — PROCHLORPERAZINE EDISYLATE 5 MG/ML
5 INJECTION INTRAMUSCULAR; INTRAVENOUS ONCE AS NEEDED
Status: DISCONTINUED | OUTPATIENT
Start: 2025-06-11 | End: 2025-06-11 | Stop reason: HOSPADM

## 2025-06-11 RX ADMIN — SODIUM CHLORIDE, POTASSIUM CHLORIDE, SODIUM LACTATE AND CALCIUM CHLORIDE 50 ML/HR: 600; 310; 30; 20 INJECTION, SOLUTION INTRAVENOUS at 15:15

## 2025-06-11 RX ADMIN — FENTANYL CITRATE 25 MCG: 50 INJECTION, SOLUTION INTRAMUSCULAR; INTRAVENOUS at 15:13

## 2025-06-11 RX ADMIN — DEXAMETHASONE SODIUM PHOSPHATE 10 MG: 4 INJECTION INTRA-ARTICULAR; INTRALESIONAL; INTRAMUSCULAR; INTRAVENOUS; SOFT TISSUE at 13:33

## 2025-06-11 RX ADMIN — OXYCODONE 5 MG: 5 TABLET ORAL at 15:56

## 2025-06-11 RX ADMIN — HYDROMORPHONE HYDROCHLORIDE 0.5 MG: 1 INJECTION, SOLUTION INTRAMUSCULAR; INTRAVENOUS; SUBCUTANEOUS at 15:42

## 2025-06-11 RX ADMIN — ROCURONIUM BROMIDE 20 MG: 10 INJECTION, SOLUTION INTRAVENOUS at 14:20

## 2025-06-11 RX ADMIN — ONDANSETRON 4 MG: 2 INJECTION INTRAMUSCULAR; INTRAVENOUS at 14:57

## 2025-06-11 RX ADMIN — LABETALOL HYDROCHLORIDE 10 MG: 5 INJECTION, SOLUTION INTRAVENOUS at 15:13

## 2025-06-11 RX ADMIN — ROCURONIUM BROMIDE 20 MG: 10 INJECTION, SOLUTION INTRAVENOUS at 14:42

## 2025-06-11 RX ADMIN — MIDAZOLAM HYDROCHLORIDE 1 MG: 2 INJECTION, SOLUTION INTRAMUSCULAR; INTRAVENOUS at 13:17

## 2025-06-11 RX ADMIN — HYDROMORPHONE HYDROCHLORIDE 0.5 MG: 1 INJECTION, SOLUTION INTRAMUSCULAR; INTRAVENOUS; SUBCUTANEOUS at 15:32

## 2025-06-11 RX ADMIN — SODIUM CHLORIDE, SODIUM LACTATE, POTASSIUM CHLORIDE, AND CALCIUM CHLORIDE: 600; 310; 30; 20 INJECTION, SOLUTION INTRAVENOUS at 13:17

## 2025-06-11 RX ADMIN — REMIFENTANIL HYDROCHLORIDE 20 MCG: 1 INJECTION, POWDER, LYOPHILIZED, FOR SOLUTION INTRAVENOUS at 13:44

## 2025-06-11 RX ADMIN — ROCURONIUM BROMIDE 50 MG: 10 INJECTION, SOLUTION INTRAVENOUS at 13:26

## 2025-06-11 RX ADMIN — REMIFENTANIL HYDROCHLORIDE 0.02 MCG/KG/MIN: 1 INJECTION, POWDER, LYOPHILIZED, FOR SOLUTION INTRAVENOUS at 13:47

## 2025-06-11 RX ADMIN — REMIFENTANIL HYDROCHLORIDE 20 MCG: 1 INJECTION, POWDER, LYOPHILIZED, FOR SOLUTION INTRAVENOUS at 14:41

## 2025-06-11 RX ADMIN — PROPOFOL 200 MG: 10 INJECTION, EMULSION INTRAVENOUS at 13:26

## 2025-06-11 RX ADMIN — ESMOLOL HYDROCHLORIDE 100 MG: 100 INJECTION, SOLUTION INTRAVENOUS at 13:25

## 2025-06-11 RX ADMIN — LIDOCAINE HYDROCHLORIDE 100 MG: 20 INJECTION INTRAVENOUS at 13:25

## 2025-06-11 RX ADMIN — SUGAMMADEX 400 MG: 100 INJECTION, SOLUTION INTRAVENOUS at 14:57

## 2025-06-11 ASSESSMENT — PAIN - FUNCTIONAL ASSESSMENT
PAIN_FUNCTIONAL_ASSESSMENT: 0-10

## 2025-06-11 ASSESSMENT — PAIN SCALES - GENERAL
PAINLEVEL_OUTOF10: 4
PAINLEVEL_OUTOF10: 5 - MODERATE PAIN
PAINLEVEL_OUTOF10: 2
PAINLEVEL_OUTOF10: 7
PAINLEVEL_OUTOF10: 0 - NO PAIN
PAINLEVEL_OUTOF10: 5 - MODERATE PAIN
PAINLEVEL_OUTOF10: 7

## 2025-06-11 ASSESSMENT — COLUMBIA-SUICIDE SEVERITY RATING SCALE - C-SSRS
2. HAVE YOU ACTUALLY HAD ANY THOUGHTS OF KILLING YOURSELF?: NO
1. IN THE PAST MONTH, HAVE YOU WISHED YOU WERE DEAD OR WISHED YOU COULD GO TO SLEEP AND NOT WAKE UP?: NO
6. HAVE YOU EVER DONE ANYTHING, STARTED TO DO ANYTHING, OR PREPARED TO DO ANYTHING TO END YOUR LIFE?: NO

## 2025-06-11 NOTE — ANESTHESIA POSTPROCEDURE EVALUATION
Patient: Delaney Novoa    Procedure Summary       Date: 06/11/25 Room / Location: [facility] OR 05 / 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    Anesthesia Post Evaluation    Patient location during evaluation: PACU  Patient participation: complete - patient participated  Level of consciousness: awake  Pain management: adequate  Airway patency: patent  Cardiovascular status: acceptable  Respiratory status: acceptable  Hydration status: acceptable  Postoperative Nausea and Vomiting: none        There were no known notable events for this encounter.

## 2025-06-11 NOTE — H&P
History Of Present Illness  Delaney Novoa is a 70 y.o. 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  None of this looks ominous but there is leukoplakia and FVC fullness.     Recommend CT with contrast with fine cuts through the larynx, as well as procedure for evaluation in OR at a time convenient to the patient.     Schedule supraglottoplasty with KTP laser ablation/possible microflap as patient wants a pathologic diagnosis with biopsy - planned CO2 and KTP Lasers available.      Follow-up with me for procedure after CT results are back.          .     CT SOFT TISSUE NECK W IV CONTRAST;  5/28/2025 10:36 am      INDICATION:  Signs/Symptoms:VOCAL CORD MASS.      ,J38.3 Other diseases of vocal cords      COMPARISON:  None.      ACCESSION NUMBER(S):  [id number]      ORDERING CLINICIAN:  ANTONIETTA EUCEDA      TECHNIQUE:  Helical CT images of the neck were obtained.  The patient received 75  ML of Omnipaque 350 intravenous contrast agent. The images were  reformatted in angled axial, coronal and sagittal planes.      FINDINGS:  Oral Cavity, Pharynx and Larynx:  The patient is edentulous. The true  vocal cords appear thickened and irregular, with no discrete mass.  There is subtle asymmetric right-sided true vocal cord enhancement  relative to the left (axial series 3, image 148).      A(3.8 x 2.8 cm transaxial) right external pharyngocoele arises from  the right piriformis sinus, and extends through the thyrohyoid  membrane presenting in the parapharyngeal space posterior to the  right submandibular salivary gland.     A smaller pharyngocele is present on the left side measuring less than 1 cm.    No discrete soft tissue mass of the upper aerodigestive tract noted.      Retropharyngeal and Prevertebral Soft Tissues:  No retropharyngeal  mass or lymphadenopathy.      Lymph nodes: No morphologically abnormal or pathologically enlarged  cervical station lymph nodes.      Neck vessels: Pronounced burden of calcified atheromatous plaque  noted at the right carotid bulb, otherwise bilateral neck vessels are  unremarkable in course and caliber and appear patent. The patient  appears to have a significant stenosis of the left M1 stem (axial  series 3, image 16), incompletely assessed on this examination.      Thyroid gland: The thyroid gland is unremarkable in size and  appearance.      Parotid and submandibular glands: Bilateral parotid and submandibular  glands are unremarkable in appearance.      Paranasal Sinuses and Mastoids: Visualized paranasal sinuses and  bilateral mastoids are clear.      Visualized orbital structures are unremarkable.      Visualized upper lungs are clear.      No acute or aggressive appearing bony abnormalities.      IMPRESSION:  1. Right-sided external pharyngocoele measuring 3.8 x 2.8 cm presents  in the right submandibular space.  2. Left-sided subcentimeter pharyngocoele also presents in the left  submandibular space.  3. Asymmetric enhancement of the right true vocal cord of  indeterminate significance.  4. 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      I personally reviewed the images/study and I agree with the findings  as stated.    Past Medical History  Medical History[1]    Surgical History  Surgical History[2]     Social History  She reports that she has quit smoking. Her smoking use included cigarettes. She started smoking about 50 years ago. She has a 25.2 pack-year smoking history. She has never used smokeless tobacco. She reports current alcohol use. She reports that she does not use drugs.    Family History  Family History[3]     Allergies  Codeine    Review of Systems   Constitutional:  Negative for fever.   Cardiovascular: Negative.  Negative for chest pain.    Genitourinary: Negative.    Skin: Negative.    Psychiatric/Behavioral: Negative.     All other systems reviewed and are negative.       Physical Exam  Constitutional:       General: She is not in acute distress.     Appearance: She is not ill-appearing.   HENT:      Right Ear: External ear normal.      Left Ear: External ear normal.      Nose: Nose normal.      Mouth/Throat:      Mouth: Mucous membranes are moist.   Eyes:      Pupils: Pupils are equal, round, and reactive to light.   Pulmonary:      Effort: Pulmonary effort is normal.   Skin:     General: Skin is warm and dry.   Neurological:      General: No focal deficit present.   Psychiatric:         Mood and Affect: Mood normal.         Assessment & Plan  Vocal cord mass      Patient and I discussed the risks, benefits and alternatives to surgery and all questions answered.  Cleared to OR.    Ted Roy MD         [1] No past medical history on file.  [2]   Past Surgical History:  Procedure Laterality Date    OTHER SURGICAL HISTORY  09/09/2019    Kidney surgery    OTHER SURGICAL HISTORY  12/27/2021    Carpal tunnel surgery   [3]   Family History  Problem Relation Name Age of Onset    Diabetes Mother      Stroke Father      Diabetes Father      Heart attack Father      Diabetes Sister      Heart attack Brother

## 2025-06-11 NOTE — ANESTHESIA PREPROCEDURE EVALUATION
Patient: Delaney Novoa    Procedure Information       Date/Time: 06/11/25 1210    Procedure: MICRODIRECT LARYNGOSCOPY, BRONCHOSCOPY, CO2 + KTP LASER, BIOPSY, INJECTION, SUPRAGLOTTOPLASTY (Bilateral)    Location: [facility] OR 05 / [facility] OR    Surgeons: Ted Roy MD            Relevant Problems   Anesthesia (within normal limits)      Pulmonary   (+) Chronic obstructive pulmonary disease (Multi)      GI   (+) Gastroesophageal reflux disease without esophagitis      HEENT  Vocal cord pathology.   (+) Hearing loss   (+) Subacute frontal sinusitis      Skin   (+) Eczema       Clinical information reviewed:   Tobacco  Allergies  Meds   Med Hx  Surg Hx   Fam Hx  Soc Hx        NPO Detail:  NPO/Void Status  Carbohydrate Drink Given Prior to Surgery? : N  Date of Last Liquid: 06/10/25  Time of Last Liquid: 2200  Date of Last Solid: 06/10/25  Time of Last Solid: 1900  Last Intake Type: Clear fluids; Light meal  Time of Last Void: 1224         Physical Exam    Airway  Mallampati: III     Cardiovascular    Dental    Pulmonary    Abdominal            Anesthesia Plan    History of general anesthesia?: yes  History of complications of general anesthesia?: no    ASA 2     general     intravenous induction   Anesthetic plan and risks discussed with patient.    Plan discussed with CRNA.

## 2025-06-11 NOTE — ANESTHESIA PROCEDURE NOTES
Airway  Date/Time: 6/11/2025 1:30 PM  Reason: elective    Airway not difficult    Staffing  Performed: SRNA   Authorized by: Nadiya Smith MD    Performed by: Geronimo Rivera  Patient location during procedure: OR    Patient Condition  Indications for airway management: anesthesia and airway protection  Patient position: sniffing  Planned trial extubation  Sedation level: deep     Final Airway Details   Preoxygenated: yes  Final airway type: endotracheal airway  Successful airway: laser tube  Cuffed: yes   Successful intubation technique: video laryngoscopy  Adjuncts used in placement: intubating stylet  Endotracheal tube insertion site: oral  Blade: Cristine  Blade size: #3  Placement verified by: chest auscultation and capnometry   Measured from: lips  ETT to lips (cm): 21  Number of attempts at approach: 1

## 2025-06-11 NOTE — OP NOTE
MICRODIRECT LARYNGOSCOPY, BRONCHOSCOPY, CO2 + KTP LASER, BIOPSY, INJECTION, SUPRAGLOTTOPLASTY (B) Operative Note     Date: 2025  OR Location: [facility] OR    Name: Delaney Novoa, : 1955, Age: 70 y.o., MRN: [id number], Sex: female    Diagnosis  Pre-op Diagnosis      * Vocal cord mass [J38.3] Post-op Diagnosis     * Vocal cord mass [J38.3]     Procedures  MICRODIRECT LARYNGOSCOPY, BRONCHOSCOPY, CO2 + KTP LASER, BIOPSY, INJECTION, SUPRAGLOTTOPLASTY  45649 - NC LARYNGOSCOPY W/WO TRACHEOSCOPY W/MICRO/TELESCOPE    MICRODIRECT LARYNGOSCOPY, BRONCHOSCOPY, CO2 + KTP LASER, BIOPSY, INJECTION, SUPRAGLOTTOPLASTY  11407 - NC BRNCHSC INCL FLUOR GDNCE DX W/CELL WASHG SPX    MICRODIRECT LARYNGOSCOPY, BRONCHOSCOPY, CO2 + KTP LASER, BIOPSY, INJECTION, SUPRAGLOTTOPLASTY  71349 - NC LARGSC W/NJX VOCAL CORD THER W/MICRO/TELESCOPE    MICRODIRECT LARYNGOSCOPY, BRONCHOSCOPY, CO2 + KTP LASER, BIOPSY, INJECTION, SUPRAGLOTTOPLASTY  17992 - NC LARYNGOSCOPY W/BIOPSY MICROSCOPE/TELESCOPE    MICRODIRECT LARYNGOSCOPY, BRONCHOSCOPY, CO2 + KTP LASER, BIOPSY, INJECTION, SUPRAGLOTTOPLASTY  27538 - NC LARGSC EXC NEERAJ&/STRPG CORDS/EPIGL MCRSCP/TLSCP    MICRODIRECT LARYNGOSCOPY, BRONCHOSCOPY, CO2 + KTP LASER, BIOPSY, INJECTION, SUPRAGLOTTOPLASTY  44843 - NC UNLISTED PROCEDURE LARYNX    NC LARGSC W/NJX VOCAL CORD THER W/MICRO/TELESCOPE [43475]  Surgeons      * Ted Roy - Primary    Resident/Fellow/Other Assistant:  Surgeons and Role:     * Valeria Miller MD - Resident - Assisting    Staff:   Circulator: Reva Villagomez Person: Christy    Anesthesia Staff: Anesthesiologist: Nadiya Smith MD  CRNA: PACO Galindo-CRNA  SRNA: Geronimo Rivera    Procedure Summary  Anesthesia: General  ASA: II  Estimated Blood Loss: 1mL  Intra-op Medications:   Administrations occurring from 1210 to 1350 on 25:   Medication Name Total Dose   sodium chloride 0.9 % irrigation solution 1,000 mL   EPINEPHrine HCl (PF) (Adrenalin) injection 4 mg    fluorescein 1 mg ophthalmic strip 1 strip   dexAMETHasone (Decadron) 4 mg/mL IV Syringe 2 mL 10 mg   esmolol (Brevibloc) injection 100 mg   LR bolus Cannot be calculated   lidocaine (cardiac) injection 2% prefilled syringe 100 mg   midazolam PF (Versed) injection 1 mg/mL 1 mg   propofol (Diprivan) injection 10 mg/mL 200 mg   remifentanil (Ultiva) 1,000 mcg in sodium chloride 0.9% 50 mL (20 mcg/mL) infusion 0 mg   remifentanil (Ultiva) injection 20 mcg   rocuronium (ZeMuron) 50 mg/5 mL injection 50 mg            Anesthesia Record               Intraprocedure I/O Totals          Intake    LR bolus 700.00 mL    Remifentanil Drip 0.00 mL    The total shown is the total volume documented since Anesthesia Start was filed.    Total Intake 700 mL          Specimen:   ID Type Source Tests Collected by Time   1 : RIGHT SUPRAGLOTTOPLASTY Tissue LARYNX BIOPSY (SPECIFY SITE) SURGICAL PATHOLOGY EXAM Ted Roy MD 6/11/2025 1353   2 : LEFT FALSE VOCAL CORD Tissue VOCAL CORD BIOPSY LEFT SURGICAL PATHOLOGY EXAM Ted Roy MD 6/11/2025 1421   3 : RIGHT VOCAL CORD LEUKOPLAKIA Tissue VOCAL CORD BIOPSY RIGHT SURGICAL PATHOLOGY EXAM Ted Roy MD 6/11/2025 1437        Findings: small areas of focal leukoplakia on the left posterior vocal cord, edematous tissue in the false vocal cords and supraglottis, tethering of the false cord on the right, deep sulcus on the right vocal cord.  Right supraglottoplasty and partial anterior arytenoidectomy.  Performed microflap excision on right + KTP laser ablation.  Bronchoscopy negative.     Indications: Delaney Novoa is an 70 y.o. 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     The patient was seen in the preoperative area. The risks, benefits, complications, treatment options, non-operative alternatives, expected recovery and outcomes were discussed with the patient. The possibilities of reaction to medication, pulmonary aspiration, injury to surrounding  structures, bleeding, recurrent infection, the need for additional procedures, failure to diagnose a condition, and creating a complication requiring transfusion or operation were discussed with the patient. The patient concurred with the proposed plan, giving informed consent.  The site of surgery was properly noted/marked if necessary per policy. The patient has been actively warmed in preoperative area. Preoperative antibiotics are not indicated. Venous thrombosis prophylaxis have been ordered including bilateral sequential compression devices    Procedure Details: The patient was seen and identified in the preoperative holding area and at that time further questions were answered. The patient was escorted to the operating suite, transferred to the operating table in a supine position. A huddle was taken, verifying the correct patient, surgical site, and procedure. The anesthesia team provided general anesthesia with a 5-0 laser endotracheal tube inserted. The patient was turned 90 degrees towards the ENT team.     Prior to the start of the case, a pre-incision pause was taken to again verify the correct patient, procedure and surgical site. In addition, a laser safety time out was performed prior to use of the laser.  Appropriate eye and face protections was applied to the patient and the operating room staff prior to laser use.  The oxygen level was also at or below 30-35% for the entirety of the laser portions of the case.      A dental guard was placed on the upper dentition.  The Dedo Laryngoscope was introduced transorally along the right and left sides of the tongue.  No concerning masses or lesions were identified in the oral cavity, oropharynx, or hypopharynx. The glottis was exposed but anterior cricoid pressure was needed to expose the anterior commissure.  The laryngoscope was secured on the mustard stand and a 0° telescope was utilized to inspect the airway. The above findings were visualized.  Photodocumentation was obtained.      The microscope was moved in position, and utilized for a majority of the below procedure.     The CO2 laser was then used to perform bilateral supraglottoplasty. Part of the anterior face of the arytenoid was taken on the right for visualization. The tissue excised was sent for pathology. On the right side, there was entry into the known laryngocele and so we performed partial excision of the laryngocele as well using the CO2 laser. Suction cautery was used to control bleeding. Specimen sent for pathology.     Attention was first directed to the right true vocal fold.  An injection of 0.2 ml of preservative free saline was performed into the area just below the mass.  This afforded improved visualization of the mucosal changes and mass effect.  A jessica knife was utilized to create a microflap which was elevated paying close attention to preserving as much SLP as possible. A variety of cold instruments were used to include flap elevators, Bouchier forceps, curved and up biting scissors to excise the lesion.   The specimen was sent for permanent pathology.     Next, the KTP laser was used to ablate leukoplakia on the left posterior vocal cord, avoiding bilateral treatment in the anterior commissure.     Hemostasis was confirmed at the end of the case after placement of 1:1000 epi pledgets. The telescope used to inspect the undersurface of the vocal folds and the proximal trachea down to the conrado. There were no concerning areas below the subglottis.  Photodocumentation was obtained.        This then concluded the operative portion of the procedure and all instruments were removed from the patient. Sponge, needle, instrument counts were reported as correct. The patient was then turned back to anesthesia for awakening and extubation. There were no complications. The patient was transported to the post-anesthesia care unit in stable condition.     Dr. Roy was present for and  participated in all critical aspects of the procedure.      Evidence of Infection:   Complications:  None; patient tolerated the procedure well.    Disposition: PACU - hemodynamically stable.  Condition: stable     Attending Attestation: I was present for the entirety of the procedure(s).     Ted Roy  Phone Number: [phone]

## 2025-06-11 NOTE — DISCHARGE INSTRUCTIONS
".  Postoperative Care Instructions:  Microdirect Laryngoscopy/Bronchoscopy with Laser Surgery for Laryngeal Lesions    Postoperative Care:  For your surgery, special microscopic instruments were used and an operating telescope was placed in your mouth to look at your vocal cords and trachea to treat your airway. No external incisions made.      It is normal for your voice to be hoarse for several days or weeks after surgery while the healing process occurs.     Your surgeon may also ask you to perform \"Voice rest\" which means no speaking for the period of time requested. Once you are allowed to start talking, it is very important to use a “conservative” or “confidential voice” after surgery to allow your voice to recover.   This means that you are using your normal voice at a much lower volume than usual, without any straining, yelling, screaming, or singing.  This typically sounds like a soft or breathy whisper.  It is also important to avoid extended periods of voice use.   Do not speak to anyone who is farther than an arm's length away, as this would require you to raise your voice.      It is very important to avoid excessive coughing or throat clearing after surgery, as this will slow down the healing process.  If you have an urge to cough that you cannot control on your own with relaxation techniques, you can purchase an over-the-counter cough suppressant to use, but make sure it does not interact with your other medications.      Finally, make sure to drink plenty of water to stay well hydrated after surgery, as this will help to speed your recovery by keeping your secretions thin and your vocal cords moist.  Use of cough lozenges is also allowed.      Diet: You may resume your normal diet after surgery. You may want to start out with liquids and light meals or soft foods and advance to your usual diet as tolerated.     Our Nurse will contact you a few days after surgery to schedule your follow up " appointment, which is typically 3-6 weeks after surgery.  For any questions or concerns, please call the respective office between the hours of 9am-4pm.   Please call:  Dr. Roy's office @ [phone]  Dr. Jiang's office @ [phone]   Dr. Garcia's office @ [phone]  If issues arise over the weekend or after hours, please call our hospital  at [phone] and ask for the ENT resident on call.    What to Expect Following Surgery:    The following are some symptoms that may follow your recent surgery:    Pain - Some mild pain is normal after surgery.  As adequate pain control is necessary for healing, please take over-the-counter Tylenol or Motrin per the package directions as needed for pain.  Occasionally, a narcotic pain medication is prescribed for your use after surgery.  If this is the case, please take the medication only as directed.  You may need to take an over-the-counter stool softener as narcotic pain medications can cause constipation. Massage, cold packs, relaxation techniques, listening to music, and positive thinking will also help control your postoperative pain.    Taste disturbance and/or tongue numbness - Due to the surgical instruments used during surgery, you may experience tongue numbness and/or taste disturbance after surgery, but this is usually temporary.  It may take 1-4 weeks to completely resolve.  It is also normal for your tongue to feel swollen or bruised after surgery, and this will also resolve in a few days.    Bleeding - For a few days after surgery, it is normal to cough up some blood in your mucus.  However, if you experience continuous bright red bleeding from your mouth or if you are coughing up blood clots, please call your doctor's office immediately.  If you are on any blood thinning medications, such as Aspirin, Plavix, or Coumadin, you may restart them immediately after surgery unless you were specifically told not to do so by your surgeon.    Muscle  aches/soreness - You may experience generalized muscle aches and/or soreness after surgery, and this is a normal effect of anesthesia.  They should completely resolve after a few days.     Swelling/throat tightness - If you were given steroid medication, this can help with swelling and should be taken as directed. The side effects from steroid use is typically minimal when taken for short periods, as used in these cases. If you have questions, please discuss with your pharmacist.

## 2025-06-25 LAB
LABORATORY COMMENT REPORT: NORMAL
PATH REPORT.COMMENTS IMP SPEC: NORMAL
PATH REPORT.FINAL DX SPEC: NORMAL
PATH REPORT.GROSS SPEC: NORMAL
PATH REPORT.RELEVANT HX SPEC: NORMAL
PATH REPORT.TOTAL CANCER: NORMAL
RESIDENT REVIEW: NORMAL

## 2025-07-14 ENCOUNTER — OFFICE VISIT (OUTPATIENT)
Dept: OTOLARYNGOLOGY | Facility: HOSPITAL | Age: 70
End: 2025-07-14
Payer: MEDICARE

## 2025-07-14 VITALS — TEMPERATURE: 98.6 F | WEIGHT: 117 LBS | BODY MASS INDEX: 21.53 KG/M2 | HEIGHT: 62 IN

## 2025-07-14 DIAGNOSIS — R49.8 OTHER VOICE AND RESONANCE DISORDERS: ICD-10-CM

## 2025-07-14 DIAGNOSIS — R49.8 VOICE FATIGUE: ICD-10-CM

## 2025-07-14 DIAGNOSIS — R09.A2 GLOBUS PHARYNGEUS: ICD-10-CM

## 2025-07-14 DIAGNOSIS — R49.0 VOICE HOARSENESS: ICD-10-CM

## 2025-07-14 DIAGNOSIS — J38.3 VOCAL CORD MASS: Primary | ICD-10-CM

## 2025-07-14 DIAGNOSIS — D14.1 LARYNX NEOPLASM BENIGN: ICD-10-CM

## 2025-07-14 DIAGNOSIS — J38.4 LARYNX EDEMA: ICD-10-CM

## 2025-07-14 DIAGNOSIS — R49.0 MUSCLE TENSION DYSPHONIA: ICD-10-CM

## 2025-07-14 PROCEDURE — 3008F BODY MASS INDEX DOCD: CPT | Performed by: OTOLARYNGOLOGY

## 2025-07-14 PROCEDURE — 1159F MED LIST DOCD IN RCRD: CPT | Performed by: OTOLARYNGOLOGY

## 2025-07-14 PROCEDURE — 99214 OFFICE O/P EST MOD 30 MIN: CPT | Mod: 25 | Performed by: OTOLARYNGOLOGY

## 2025-07-14 PROCEDURE — 99214 OFFICE O/P EST MOD 30 MIN: CPT | Performed by: OTOLARYNGOLOGY

## 2025-07-14 PROCEDURE — 1126F AMNT PAIN NOTED NONE PRSNT: CPT | Performed by: OTOLARYNGOLOGY

## 2025-07-14 PROCEDURE — 31579 LARYNGOSCOPY TELESCOPIC: CPT | Performed by: OTOLARYNGOLOGY

## 2025-07-14 ASSESSMENT — PAIN SCALES - GENERAL: PAINLEVEL_OUTOF10: 0-NO PAIN

## 2025-07-14 NOTE — PROGRESS NOTES
ASSESSMENT AND PLAN:   Delaney Novoa is a 70 y.o. female with a history of a right vocal cord cancer.    Today's examination to include stroboscopy demonstrates edema on the right without visible mass/lesion. 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    We discussed that we performed a full resection. Options to include XRT vs observation were discussed. Based upon pathology and visual clearance with today's exam - we recommended observation.   She would like to do this.  Agrees to close observation.     I would like to see the patient back in 6-8 weeks.        Assessment & Plan  Patient presents with right vocal cord squamous cell cancer and a history of right vocal cord edema without visible mass lesion and leukoplakia on the left vocal cord who was found to have the following findings on stroboscopy: Voice grade 1, roughness 1, breathiness 0, asthenia 0, strain 1, normal intelligibility, balanced resonance, normal vocal loudness, normal breath support, subglottic edema, no thick mucus, no granuloma, no ventricular obliteration, complete erythema, mild thickening, moderate diffuse vocal cord edema, no laryngitis, limited adduction on the right, normal arytenoid height, lateral supraglottic tension, asymmetric symmetry with moderate restriction on the right, closed glottis and physical exam: right vocal cord edema without visible mass lesion and leukoplakia on the left vocal cord.    We discussed options for management to include: radiation or continued observation.    Treatment plan: Recommended continued observation. Avoid smoking and rest voice if tired.    Follow-up: Follow up in 6 to 8 weeks.      Reason For Consult  No chief complaint on file.       HISTORY OF PRESENT ILLNESS:  Delaney Novoa is a 70 y.o. female presenting for a follow up visit with me for a POV s/p excision of a vocal cord mass with biopsy showing a vocal cord cancer.  The patient reports the voice is  improving.        Prior History:   Post-op 6/11/25: small areas of focal leukoplakia on the left posterior vocal cord, edematous tissue in the false vocal cords and supraglottis, tethering of the false cord on the right, deep sulcus on the right vocal cord.  Right supraglottoplasty and partial anterior arytenoidectomy.  Performed microflap excision on right + KTP laser ablation.  Bronchoscopy negative.      History of Present Illness  The patient is a 70-year-old female who had a mass on her right vocal cord removed on June 11, 2025.    Mass on Right Vocal Cord  - The mass was found to be a small squamous cell cancer.  - The base of the lesion was completely treated with a laser.    General Health  - She reports feeling good overall.  - Says her voice is back to normal.    Smoking History  - She smoked a little after the procedure but has since quit smoking without needing any help.    SOCIAL HISTORY  - Smoked minimally post-procedure but has now quit         Past Medical History  She has a past medical history of Delayed emergence from general anesthesia. Surgical History  She has a past surgical history that includes Other surgical history (09/09/2019) and Other surgical history (12/27/2021).   Social History  She reports that she has quit smoking. Her smoking use included cigarettes. She started smoking about 50 years ago. She has a 25.3 pack-year smoking history. She has never used smokeless tobacco. She reports current alcohol use. She reports that she does not use drugs. Allergies  Codeine     Family History  Family History[1]    Review of Systems  All 10 systems were reviewed and negative except for above.      Last Recorded Vitals  There were no vitals taken for this visit.    Physical Exam  ENT Physical Exam  Constitutional  Appearance: patient appears well-developed and well-nourished,  Head and Face  Appearance: head appears normal and face appears normal;  Ear  Auricles: right auricle normal; left  auricle normal;  Nose  External Nose: nares patent bilaterally;  Oral Cavity/Oropharynx  Lips: normal;  Neck  Neck: neck normal; neck palpation normal;  Respiratory  Inspection: no retractions visible;  Cardiovascular  Inspection: no peripheral edema present;  Neurovestibular  Mental Status: alert and oriented;  Psychiatric: mood normal;  Cranial Nerves: cranial nerves intact;     Physical Exam  Oral Cavity: Fully edentulous with full lower and upper dentures.  Neck: No lymphadenopathy or masses.  Specialty Assessment: Nasal endoscopy showed slight deviation to the left. Right vocal cord edematous with fairly thin leukoplakia on the left vocal cord. Subglottic edema present. Moderate, diffuse vocal cord edema with complete erythema and mild thickening. Limited adduction and moderate restriction on the right. Closed glottis.    History of Present Illness  The patient is a 70-year-old female who had a mass on her right vocal cord removed on June 11, 2025.    Mass on Right Vocal Cord  - The mass was found to be a small squamous cell cancer.  - The base of the lesion was completely treated with a laser.    General Health  - She reports feeling good overall.  - Says her voice is back to normal.    Smoking History  - She smoked a little after the procedure but has since quit smoking without needing any help.    SOCIAL HISTORY  - Smoked minimally post-procedure but has now quit    Results  - Biopsy of the right vocal cord (06/11/2025):    - 0.7 x 0.5 x 0.2 cm squamous cell cancer  - Stroboscopy:    - Fairly thin leukoplakia on the left vocal cord    - Significant edema on the right vocal cord         Relevant Results Pathology  RIGHT VOCAL CORD LEUKOPLAKIA, BIOPSY:      -- Keratinizing squamous cell carcinoma, at least superficially invasive, see note.   0.7 x 0.5 x 0.2 cm     Procedures     Flexible Laryngoscopy w/ Videostroboscopy    VOICE AND SPEECH CHARACTERISTICS:  Normal spoken speech, (+) mild dysphonia, (+) mild  roughness, no breathiness, no asthenia, (+) mild strain.    Intelligibility: normal.   Resonance: balanced.   Vocal Loudness: normal.   Breath Support: normal.    PROCEDURE:    Indications: voice change  PROCEDURE NOTE: FLEXIBLE LARYNGOSCOPY WITH STROBOSCOPY  I recommended a flexible laryngoscopy with stroboscopy based on PE findings, and/or concern for mucosal wave details based upon history and/or for issues associated with hyperreflexic gag on mirror exam concerning for pathology. Risks, benefits, and alternatives were explained. The patient wishes to proceed and gives verbal consent.   Patient is seated in the exam chair. After adequate topical anesthesia, I advance the flexible endoscope. The examination included evaluation of the herndon, vallecula, base of tongue, pyriforms, post-cricoid area, larynx and immediate subglottis.  Findings : assessment of the nasopharynx, base of tongue/vallecula, pyriform sinuses, post-cricoid area and pharyngeal walls was without lesion or mass, pharyngeal wall contraction is normal and symmetric, and no pooling of secretions  subglottic edema:2 (present), erythema/hyperemia: 2 (arytenoids), IA thickenin (mild), and TVC edema: 2 (moderate)  Gross Arytenoid Movement: limited adduction right.  Arytenoid Height: normal.   Supraglottic Tension: lateral.  Symmetry: asymmetry.   Amplitude: reduced: right.  Phase Closure: in-phase.  Mucosal Wave Lateral Excursion/Secondary Wave: Right Vocal Cord: moderate restriction - Wave moved up to ¼ the width of the vocal fold.  Periodicity: aperiodic.  Closure: closed.      Time Spent  Prep time on day of patient encounter: 10 minutes  Time spent directly with patient, family or caregiver: 15 minutes  Additional Time Spent on Patient Care Activities/Discussion with SLP re care plan: 5 minutes  Documentation Time: 10 minutes  Other Time Spent: 0 minutes  Total: 40 minutes     Scribe Attestation  By signing my name below, Marie NEELY  Scribe attest that this documentation has been prepared under the direction and in the presence of Ted Roy MD.      This medical note was created with the assistance of artificial intelligence (AI) for documentation purposes. The content has been reviewed and confirmed by the healthcare provider for accuracy and completeness. Patient consented to the use of audio recording and use of AI during their visit.          [1]   Family History  Problem Relation Name Age of Onset    Diabetes Mother      Stroke Father      Diabetes Father      Heart attack Father      Diabetes Sister      Heart attack Brother

## 2025-08-23 ENCOUNTER — HOSPITAL ENCOUNTER (INPATIENT)
Facility: HOSPITAL | Age: 70
DRG: 871 | End: 2025-08-23
Attending: EMERGENCY MEDICINE | Admitting: INTERNAL MEDICINE
Payer: MEDICARE

## 2025-08-23 ENCOUNTER — APPOINTMENT (OUTPATIENT)
Dept: RADIOLOGY | Facility: HOSPITAL | Age: 70
DRG: 871 | End: 2025-08-23
Payer: MEDICARE

## 2025-08-23 ENCOUNTER — APPOINTMENT (OUTPATIENT)
Dept: CARDIOLOGY | Facility: HOSPITAL | Age: 70
DRG: 871 | End: 2025-08-23
Payer: MEDICARE

## 2025-08-23 DIAGNOSIS — J18.9 PNEUMONIA OF RIGHT LUNG DUE TO INFECTIOUS ORGANISM, UNSPECIFIED PART OF LUNG: ICD-10-CM

## 2025-08-23 DIAGNOSIS — I70.1 RENAL ARTERY STENOSIS: ICD-10-CM

## 2025-08-23 DIAGNOSIS — N17.9 AKI (ACUTE KIDNEY INJURY): ICD-10-CM

## 2025-08-23 DIAGNOSIS — A41.9 SEPSIS, DUE TO UNSPECIFIED ORGANISM, UNSPECIFIED WHETHER ACUTE ORGAN DYSFUNCTION PRESENT (MULTI): Primary | ICD-10-CM

## 2025-08-23 LAB
ALBUMIN SERPL BCP-MCNC: 3.2 G/DL (ref 3.4–5)
ALP SERPL-CCNC: 83 U/L (ref 33–136)
ALT SERPL W P-5'-P-CCNC: 50 U/L (ref 7–45)
ANION GAP SERPL CALC-SCNC: 16 MMOL/L (ref 10–20)
AST SERPL W P-5'-P-CCNC: 109 U/L (ref 9–39)
BASOPHILS # BLD AUTO: 0.04 X10*3/UL (ref 0–0.1)
BASOPHILS NFR BLD AUTO: 0.4 %
BILIRUB DIRECT SERPL-MCNC: 0.2 MG/DL (ref 0–0.3)
BILIRUB SERPL-MCNC: 0.5 MG/DL (ref 0–1.2)
BNP SERPL-MCNC: 97 PG/ML (ref 0–99)
BUN SERPL-MCNC: 30 MG/DL (ref 6–23)
CALCIUM SERPL-MCNC: 8.9 MG/DL (ref 8.6–10.3)
CARDIAC TROPONIN I PNL SERPL HS: 22 NG/L (ref 0–13)
CARDIAC TROPONIN I PNL SERPL HS: 25 NG/L (ref 0–13)
CHLORIDE SERPL-SCNC: 89 MMOL/L (ref 98–107)
CO2 SERPL-SCNC: 25 MMOL/L (ref 21–32)
CREAT SERPL-MCNC: 1.78 MG/DL (ref 0.5–1.05)
EGFRCR SERPLBLD CKD-EPI 2021: 30 ML/MIN/1.73M*2
EOSINOPHIL # BLD AUTO: 0 X10*3/UL (ref 0–0.7)
EOSINOPHIL NFR BLD AUTO: 0 %
ERYTHROCYTE [DISTWIDTH] IN BLOOD BY AUTOMATED COUNT: 15.1 % (ref 11.5–14.5)
FLUAV RNA RESP QL NAA+PROBE: NOT DETECTED
FLUBV RNA RESP QL NAA+PROBE: NOT DETECTED
GLUCOSE SERPL-MCNC: 124 MG/DL (ref 74–99)
HAV IGM SER QL: NONREACTIVE
HBV CORE IGM SER QL: NONREACTIVE
HBV SURFACE AG SERPL QL IA: NONREACTIVE
HCT VFR BLD AUTO: 34 % (ref 36–46)
HCV AB SER QL: NONREACTIVE
HGB BLD-MCNC: 11.8 G/DL (ref 12–16)
IMM GRANULOCYTES # BLD AUTO: 0.06 X10*3/UL (ref 0–0.7)
IMM GRANULOCYTES NFR BLD AUTO: 0.6 % (ref 0–0.9)
LACTATE SERPL-SCNC: 1.1 MMOL/L (ref 0.4–2)
LIPASE SERPL-CCNC: 83 U/L (ref 9–82)
LYMPHOCYTES # BLD AUTO: 0.28 X10*3/UL (ref 1.2–4.8)
LYMPHOCYTES NFR BLD AUTO: 3 %
MAGNESIUM SERPL-MCNC: 2.2 MG/DL (ref 1.6–2.4)
MCH RBC QN AUTO: 30.9 PG (ref 26–34)
MCHC RBC AUTO-ENTMCNC: 34.7 G/DL (ref 32–36)
MCV RBC AUTO: 89 FL (ref 80–100)
MONOCYTES # BLD AUTO: 0.21 X10*3/UL (ref 0.1–1)
MONOCYTES NFR BLD AUTO: 2.3 %
NEUTROPHILS # BLD AUTO: 8.7 X10*3/UL (ref 1.2–7.7)
NEUTROPHILS NFR BLD AUTO: 93.7 %
NRBC BLD-RTO: 0 /100 WBCS (ref 0–0)
PHOSPHATE SERPL-MCNC: 2.7 MG/DL (ref 2.5–4.9)
PLATELET # BLD AUTO: 265 X10*3/UL (ref 150–450)
POTASSIUM SERPL-SCNC: 3.7 MMOL/L (ref 3.5–5.3)
PROT SERPL-MCNC: 6.6 G/DL (ref 6.4–8.2)
RBC # BLD AUTO: 3.82 X10*6/UL (ref 4–5.2)
SARS-COV-2 RNA RESP QL NAA+PROBE: NOT DETECTED
SODIUM SERPL-SCNC: 126 MMOL/L (ref 136–145)
WBC # BLD AUTO: 9.3 X10*3/UL (ref 4.4–11.3)

## 2025-08-23 PROCEDURE — 2550000001 HC RX 255 CONTRASTS: Performed by: EMERGENCY MEDICINE

## 2025-08-23 PROCEDURE — 74177 CT ABD & PELVIS W/CONTRAST: CPT

## 2025-08-23 PROCEDURE — 80053 COMPREHEN METABOLIC PANEL: CPT | Performed by: NURSE PRACTITIONER

## 2025-08-23 PROCEDURE — 99223 1ST HOSP IP/OBS HIGH 75: CPT | Performed by: NURSE PRACTITIONER

## 2025-08-23 PROCEDURE — 87636 SARSCOV2 & INF A&B AMP PRB: CPT | Performed by: NURSE PRACTITIONER

## 2025-08-23 PROCEDURE — 70450 CT HEAD/BRAIN W/O DYE: CPT

## 2025-08-23 PROCEDURE — 2500000004 HC RX 250 GENERAL PHARMACY W/ HCPCS (ALT 636 FOR OP/ED): Performed by: NURSE PRACTITIONER

## 2025-08-23 PROCEDURE — 84145 PROCALCITONIN (PCT): CPT | Mod: PORLAB | Performed by: NURSE PRACTITIONER

## 2025-08-23 PROCEDURE — 83605 ASSAY OF LACTIC ACID: CPT | Performed by: NURSE PRACTITIONER

## 2025-08-23 PROCEDURE — 36415 COLL VENOUS BLD VENIPUNCTURE: CPT | Performed by: NURSE PRACTITIONER

## 2025-08-23 PROCEDURE — 2500000002 HC RX 250 W HCPCS SELF ADMINISTERED DRUGS (ALT 637 FOR MEDICARE OP, ALT 636 FOR OP/ED): Performed by: NURSE PRACTITIONER

## 2025-08-23 PROCEDURE — 80074 ACUTE HEPATITIS PANEL: CPT | Mod: PORLAB | Performed by: NURSE PRACTITIONER

## 2025-08-23 PROCEDURE — 96372 THER/PROPH/DIAG INJ SC/IM: CPT | Performed by: NURSE PRACTITIONER

## 2025-08-23 PROCEDURE — 83880 ASSAY OF NATRIURETIC PEPTIDE: CPT | Performed by: NURSE PRACTITIONER

## 2025-08-23 PROCEDURE — 71275 CT ANGIOGRAPHY CHEST: CPT

## 2025-08-23 PROCEDURE — 87040 BLOOD CULTURE FOR BACTERIA: CPT | Mod: PORLAB | Performed by: NURSE PRACTITIONER

## 2025-08-23 PROCEDURE — 84484 ASSAY OF TROPONIN QUANT: CPT | Performed by: NURSE PRACTITIONER

## 2025-08-23 PROCEDURE — 96367 TX/PROPH/DG ADDL SEQ IV INF: CPT

## 2025-08-23 PROCEDURE — 83735 ASSAY OF MAGNESIUM: CPT | Performed by: NURSE PRACTITIONER

## 2025-08-23 PROCEDURE — 85025 COMPLETE CBC W/AUTO DIFF WBC: CPT | Performed by: NURSE PRACTITIONER

## 2025-08-23 PROCEDURE — 84100 ASSAY OF PHOSPHORUS: CPT | Performed by: NURSE PRACTITIONER

## 2025-08-23 PROCEDURE — 83690 ASSAY OF LIPASE: CPT | Performed by: NURSE PRACTITIONER

## 2025-08-23 PROCEDURE — 96365 THER/PROPH/DIAG IV INF INIT: CPT | Mod: 59

## 2025-08-23 PROCEDURE — 84075 ASSAY ALKALINE PHOSPHATASE: CPT | Performed by: NURSE PRACTITIONER

## 2025-08-23 PROCEDURE — 2060000001 HC INTERMEDIATE ICU ROOM DAILY

## 2025-08-23 PROCEDURE — 99291 CRITICAL CARE FIRST HOUR: CPT | Performed by: NURSE PRACTITIONER

## 2025-08-23 PROCEDURE — 96360 HYDRATION IV INFUSION INIT: CPT | Mod: 59

## 2025-08-23 PROCEDURE — 93005 ELECTROCARDIOGRAM TRACING: CPT

## 2025-08-23 PROCEDURE — 96361 HYDRATE IV INFUSION ADD-ON: CPT

## 2025-08-23 PROCEDURE — 94640 AIRWAY INHALATION TREATMENT: CPT

## 2025-08-23 PROCEDURE — 2500000001 HC RX 250 WO HCPCS SELF ADMINISTERED DRUGS (ALT 637 FOR MEDICARE OP): Performed by: NURSE PRACTITIONER

## 2025-08-23 RX ORDER — ENOXAPARIN SODIUM 100 MG/ML
30 INJECTION SUBCUTANEOUS EVERY 24 HOURS
Status: DISCONTINUED | OUTPATIENT
Start: 2025-08-23 | End: 2025-08-28 | Stop reason: HOSPADM

## 2025-08-23 RX ORDER — PANTOPRAZOLE SODIUM 40 MG/1
40 TABLET, DELAYED RELEASE ORAL
Status: DISCONTINUED | OUTPATIENT
Start: 2025-08-24 | End: 2025-08-28 | Stop reason: HOSPADM

## 2025-08-23 RX ORDER — IPRATROPIUM BROMIDE AND ALBUTEROL SULFATE 2.5; .5 MG/3ML; MG/3ML
3 SOLUTION RESPIRATORY (INHALATION) EVERY 2 HOUR PRN
Status: DISCONTINUED | OUTPATIENT
Start: 2025-08-23 | End: 2025-08-28 | Stop reason: HOSPADM

## 2025-08-23 RX ORDER — ONDANSETRON HYDROCHLORIDE 2 MG/ML
4 INJECTION, SOLUTION INTRAVENOUS EVERY 8 HOURS PRN
Status: DISCONTINUED | OUTPATIENT
Start: 2025-08-23 | End: 2025-08-28 | Stop reason: HOSPADM

## 2025-08-23 RX ORDER — ONDANSETRON 4 MG/1
4 TABLET, FILM COATED ORAL EVERY 8 HOURS PRN
Status: DISCONTINUED | OUTPATIENT
Start: 2025-08-23 | End: 2025-08-28 | Stop reason: HOSPADM

## 2025-08-23 RX ORDER — GUAIFENESIN 600 MG/1
600 TABLET, EXTENDED RELEASE ORAL EVERY 12 HOURS PRN
Status: DISCONTINUED | OUTPATIENT
Start: 2025-08-23 | End: 2025-08-28 | Stop reason: HOSPADM

## 2025-08-23 RX ORDER — CEFTRIAXONE 2 G/50ML
2 INJECTION, SOLUTION INTRAVENOUS ONCE
Status: COMPLETED | OUTPATIENT
Start: 2025-08-23 | End: 2025-08-23

## 2025-08-23 RX ORDER — TALC
3 POWDER (GRAM) TOPICAL NIGHTLY PRN
Status: DISCONTINUED | OUTPATIENT
Start: 2025-08-23 | End: 2025-08-28 | Stop reason: HOSPADM

## 2025-08-23 RX ORDER — POLYETHYLENE GLYCOL 3350 17 G/17G
17 POWDER, FOR SOLUTION ORAL DAILY
Status: DISCONTINUED | OUTPATIENT
Start: 2025-08-23 | End: 2025-08-28 | Stop reason: HOSPADM

## 2025-08-23 RX ORDER — SODIUM CHLORIDE, SODIUM LACTATE, POTASSIUM CHLORIDE, CALCIUM CHLORIDE 600; 310; 30; 20 MG/100ML; MG/100ML; MG/100ML; MG/100ML
100 INJECTION, SOLUTION INTRAVENOUS CONTINUOUS
Status: DISCONTINUED | OUTPATIENT
Start: 2025-08-23 | End: 2025-08-24

## 2025-08-23 RX ORDER — CEFTRIAXONE 2 G/50ML
2 INJECTION, SOLUTION INTRAVENOUS EVERY 24 HOURS
Status: DISCONTINUED | OUTPATIENT
Start: 2025-08-24 | End: 2025-08-26

## 2025-08-23 RX ORDER — ACETAMINOPHEN 325 MG/1
975 TABLET ORAL ONCE
Status: COMPLETED | OUTPATIENT
Start: 2025-08-23 | End: 2025-08-23

## 2025-08-23 RX ORDER — IPRATROPIUM BROMIDE AND ALBUTEROL SULFATE 2.5; .5 MG/3ML; MG/3ML
3 SOLUTION RESPIRATORY (INHALATION)
Status: DISCONTINUED | OUTPATIENT
Start: 2025-08-23 | End: 2025-08-23

## 2025-08-23 RX ORDER — ATORVASTATIN CALCIUM 40 MG/1
20 TABLET, FILM COATED ORAL DAILY
Status: CANCELLED | OUTPATIENT
Start: 2025-08-23

## 2025-08-23 RX ORDER — BISACODYL 5 MG
10 TABLET, DELAYED RELEASE (ENTERIC COATED) ORAL DAILY PRN
Status: DISCONTINUED | OUTPATIENT
Start: 2025-08-23 | End: 2025-08-28 | Stop reason: HOSPADM

## 2025-08-23 RX ORDER — PANTOPRAZOLE SODIUM 40 MG/10ML
40 INJECTION, POWDER, LYOPHILIZED, FOR SOLUTION INTRAVENOUS
Status: DISCONTINUED | OUTPATIENT
Start: 2025-08-24 | End: 2025-08-28 | Stop reason: HOSPADM

## 2025-08-23 RX ADMIN — SODIUM CHLORIDE, SODIUM LACTATE, POTASSIUM CHLORIDE, AND CALCIUM CHLORIDE 100 ML/HR: .6; .31; .03; .02 INJECTION, SOLUTION INTRAVENOUS at 13:07

## 2025-08-23 RX ADMIN — SODIUM CHLORIDE 500 ML: 0.9 INJECTION, SOLUTION INTRAVENOUS at 10:20

## 2025-08-23 RX ADMIN — AZITHROMYCIN MONOHYDRATE 500 MG: 500 INJECTION, POWDER, LYOPHILIZED, FOR SOLUTION INTRAVENOUS at 21:18

## 2025-08-23 RX ADMIN — SODIUM CHLORIDE, SODIUM LACTATE, POTASSIUM CHLORIDE, AND CALCIUM CHLORIDE 1000 ML: .6; .31; .03; .02 INJECTION, SOLUTION INTRAVENOUS at 09:00

## 2025-08-23 RX ADMIN — CEFTRIAXONE 2 G: 2 INJECTION, SOLUTION INTRAVENOUS at 12:07

## 2025-08-23 RX ADMIN — ENOXAPARIN SODIUM 30 MG: 30 INJECTION SUBCUTANEOUS at 13:45

## 2025-08-23 RX ADMIN — ACETAMINOPHEN 975 MG: 325 TABLET ORAL at 10:23

## 2025-08-23 RX ADMIN — IOHEXOL 75 ML: 350 INJECTION, SOLUTION INTRAVENOUS at 10:57

## 2025-08-23 RX ADMIN — IPRATROPIUM BROMIDE AND ALBUTEROL SULFATE 3 ML: 2.5; .5 SOLUTION RESPIRATORY (INHALATION) at 13:28

## 2025-08-23 RX ADMIN — DOXYCYCLINE 100 MG: 100 INJECTION, POWDER, LYOPHILIZED, FOR SOLUTION INTRAVENOUS at 12:40

## 2025-08-23 SDOH — ECONOMIC STABILITY: HOUSING INSECURITY: AT ANY TIME IN THE PAST 12 MONTHS, WERE YOU HOMELESS OR LIVING IN A SHELTER (INCLUDING NOW)?: NO

## 2025-08-23 SDOH — SOCIAL STABILITY: SOCIAL INSECURITY
WITHIN THE LAST YEAR, HAVE YOU BEEN KICKED, HIT, SLAPPED, OR OTHERWISE PHYSICALLY HURT BY YOUR PARTNER OR EX-PARTNER?: NO

## 2025-08-23 SDOH — ECONOMIC STABILITY: HOUSING INSECURITY: IN THE LAST 12 MONTHS, WAS THERE A TIME WHEN YOU WERE NOT ABLE TO PAY THE MORTGAGE OR RENT ON TIME?: NO

## 2025-08-23 SDOH — ECONOMIC STABILITY: FOOD INSECURITY: WITHIN THE PAST 12 MONTHS, THE FOOD YOU BOUGHT JUST DIDN'T LAST AND YOU DIDN'T HAVE MONEY TO GET MORE.: NEVER TRUE

## 2025-08-23 SDOH — SOCIAL STABILITY: SOCIAL INSECURITY: WITHIN THE LAST YEAR, HAVE YOU BEEN AFRAID OF YOUR PARTNER OR EX-PARTNER?: NO

## 2025-08-23 SDOH — ECONOMIC STABILITY: INCOME INSECURITY: IN THE PAST 12 MONTHS HAS THE ELECTRIC, GAS, OIL, OR WATER COMPANY THREATENED TO SHUT OFF SERVICES IN YOUR HOME?: NO

## 2025-08-23 SDOH — ECONOMIC STABILITY: FOOD INSECURITY: WITHIN THE PAST 12 MONTHS, YOU WORRIED THAT YOUR FOOD WOULD RUN OUT BEFORE YOU GOT THE MONEY TO BUY MORE.: NEVER TRUE

## 2025-08-23 SDOH — SOCIAL STABILITY: SOCIAL INSECURITY
WITHIN THE LAST YEAR, HAVE YOU BEEN RAPED OR FORCED TO HAVE ANY KIND OF SEXUAL ACTIVITY BY YOUR PARTNER OR EX-PARTNER?: NO

## 2025-08-23 SDOH — HEALTH STABILITY: PHYSICAL HEALTH: ON AVERAGE, HOW MANY MINUTES DO YOU ENGAGE IN EXERCISE AT THIS LEVEL?: 0 MIN

## 2025-08-23 SDOH — ECONOMIC STABILITY: HOUSING INSECURITY: IN THE PAST 12 MONTHS, HOW MANY TIMES HAVE YOU MOVED WHERE YOU WERE LIVING?: 0

## 2025-08-23 SDOH — SOCIAL STABILITY: SOCIAL INSECURITY: ABUSE: ADULT

## 2025-08-23 SDOH — HEALTH STABILITY: PHYSICAL HEALTH: ON AVERAGE, HOW MANY DAYS PER WEEK DO YOU ENGAGE IN MODERATE TO STRENUOUS EXERCISE (LIKE A BRISK WALK)?: 0 DAYS

## 2025-08-23 SDOH — SOCIAL STABILITY: SOCIAL INSECURITY: WITHIN THE LAST YEAR, HAVE YOU BEEN HUMILIATED OR EMOTIONALLY ABUSED IN OTHER WAYS BY YOUR PARTNER OR EX-PARTNER?: NO

## 2025-08-23 SDOH — HEALTH STABILITY: PHYSICAL HEALTH
HOW OFTEN DO YOU NEED TO HAVE SOMEONE HELP YOU WHEN YOU READ INSTRUCTIONS, PAMPHLETS, OR OTHER WRITTEN MATERIAL FROM YOUR DOCTOR OR PHARMACY?: RARELY

## 2025-08-23 SDOH — ECONOMIC STABILITY: FOOD INSECURITY: HOW HARD IS IT FOR YOU TO PAY FOR THE VERY BASICS LIKE FOOD, HOUSING, MEDICAL CARE, AND HEATING?: NOT VERY HARD

## 2025-08-23 SDOH — SOCIAL STABILITY: SOCIAL INSECURITY: WERE YOU ABLE TO COMPLETE ALL THE BEHAVIORAL HEALTH SCREENINGS?: YES

## 2025-08-23 SDOH — SOCIAL STABILITY: SOCIAL INSECURITY: HAVE YOU HAD THOUGHTS OF HARMING ANYONE ELSE?: NO

## 2025-08-23 SDOH — ECONOMIC STABILITY: TRANSPORTATION INSECURITY: IN THE PAST 12 MONTHS, HAS LACK OF TRANSPORTATION KEPT YOU FROM MEDICAL APPOINTMENTS OR FROM GETTING MEDICATIONS?: NO

## 2025-08-23 ASSESSMENT — LIFESTYLE VARIABLES
HOW OFTEN DO YOU HAVE 6 OR MORE DRINKS ON ONE OCCASION: NEVER
HOW MANY STANDARD DRINKS CONTAINING ALCOHOL DO YOU HAVE ON A TYPICAL DAY: PATIENT DOES NOT DRINK
SKIP TO QUESTIONS 9-10: 1
AUDIT-C TOTAL SCORE: 0
HOW OFTEN DO YOU HAVE A DRINK CONTAINING ALCOHOL: NEVER
EVER HAD A DRINK FIRST THING IN THE MORNING TO STEADY YOUR NERVES TO GET RID OF A HANGOVER: NO
EVER FELT BAD OR GUILTY ABOUT YOUR DRINKING: NO
AUDIT-C TOTAL SCORE: 0
TOTAL SCORE: 0
HAVE YOU EVER FELT YOU SHOULD CUT DOWN ON YOUR DRINKING: NO
HAVE PEOPLE ANNOYED YOU BY CRITICIZING YOUR DRINKING: NO

## 2025-08-23 ASSESSMENT — COGNITIVE AND FUNCTIONAL STATUS - GENERAL
MOBILITY SCORE: 24
PATIENT BASELINE BEDBOUND: NO
DAILY ACTIVITIY SCORE: 24

## 2025-08-23 ASSESSMENT — ACTIVITIES OF DAILY LIVING (ADL)
HEARING - RIGHT EAR: DIFFICULTY WITH NOISE
TOILETING: INDEPENDENT
JUDGMENT_ADEQUATE_SAFELY_COMPLETE_DAILY_ACTIVITIES: YES
HEARING - LEFT EAR: DIFFICULTY WITH NOISE
DRESSING YOURSELF: INDEPENDENT
PATIENT'S MEMORY ADEQUATE TO SAFELY COMPLETE DAILY ACTIVITIES?: YES
ADEQUATE_TO_COMPLETE_ADL: YES
GROOMING: INDEPENDENT
LACK_OF_TRANSPORTATION: NO
WALKS IN HOME: INDEPENDENT
FEEDING YOURSELF: INDEPENDENT
BATHING: INDEPENDENT

## 2025-08-23 ASSESSMENT — PAIN DESCRIPTION - PAIN TYPE: TYPE: ACUTE PAIN

## 2025-08-23 ASSESSMENT — PAIN - FUNCTIONAL ASSESSMENT: PAIN_FUNCTIONAL_ASSESSMENT: 0-10

## 2025-08-23 ASSESSMENT — PAIN SCALES - GENERAL
PAINLEVEL_OUTOF10: 3
PAINLEVEL_OUTOF10: 6

## 2025-08-23 ASSESSMENT — PATIENT HEALTH QUESTIONNAIRE - PHQ9
2. FEELING DOWN, DEPRESSED OR HOPELESS: NOT AT ALL
SUM OF ALL RESPONSES TO PHQ9 QUESTIONS 1 & 2: 0
1. LITTLE INTEREST OR PLEASURE IN DOING THINGS: NOT AT ALL

## 2025-08-23 ASSESSMENT — PAIN DESCRIPTION - LOCATION: LOCATION: ABDOMEN

## 2025-08-24 VITALS
OXYGEN SATURATION: 91 % | HEART RATE: 109 BPM | BODY MASS INDEX: 22.31 KG/M2 | WEIGHT: 121.25 LBS | DIASTOLIC BLOOD PRESSURE: 84 MMHG | SYSTOLIC BLOOD PRESSURE: 126 MMHG | TEMPERATURE: 99.5 F | RESPIRATION RATE: 16 BRPM | HEIGHT: 62 IN

## 2025-08-24 LAB
ALBUMIN SERPL BCP-MCNC: 2.5 G/DL (ref 3.4–5)
ALP SERPL-CCNC: 76 U/L (ref 33–136)
ALT SERPL W P-5'-P-CCNC: 38 U/L (ref 7–45)
ANION GAP SERPL CALC-SCNC: 11 MMOL/L (ref 10–20)
AST SERPL W P-5'-P-CCNC: 72 U/L (ref 9–39)
BACTERIA BLD CULT: NORMAL
BACTERIA BLD CULT: NORMAL
BILIRUB SERPL-MCNC: 0.3 MG/DL (ref 0–1.2)
BUN SERPL-MCNC: 27 MG/DL (ref 6–23)
CALCIUM SERPL-MCNC: 8.1 MG/DL (ref 8.6–10.3)
CHLORIDE SERPL-SCNC: 96 MMOL/L (ref 98–107)
CO2 SERPL-SCNC: 24 MMOL/L (ref 21–32)
CREAT SERPL-MCNC: 1.47 MG/DL (ref 0.5–1.05)
EGFRCR SERPLBLD CKD-EPI 2021: 38 ML/MIN/1.73M*2
ERYTHROCYTE [DISTWIDTH] IN BLOOD BY AUTOMATED COUNT: 15.4 % (ref 11.5–14.5)
GLUCOSE SERPL-MCNC: 85 MG/DL (ref 74–99)
HCT VFR BLD AUTO: 27.8 % (ref 36–46)
HGB BLD-MCNC: 10 G/DL (ref 12–16)
MCH RBC QN AUTO: 31.3 PG (ref 26–34)
MCHC RBC AUTO-ENTMCNC: 36 G/DL (ref 32–36)
MCV RBC AUTO: 87 FL (ref 80–100)
NRBC BLD-RTO: 0 /100 WBCS (ref 0–0)
PLATELET # BLD AUTO: 265 X10*3/UL (ref 150–450)
POTASSIUM SERPL-SCNC: 3.6 MMOL/L (ref 3.5–5.3)
PROCALCITONIN SERPL-MCNC: 4.15 NG/ML
PROT SERPL-MCNC: 5.2 G/DL (ref 6.4–8.2)
RBC # BLD AUTO: 3.19 X10*6/UL (ref 4–5.2)
SODIUM SERPL-SCNC: 127 MMOL/L (ref 136–145)
WBC # BLD AUTO: 9.3 X10*3/UL (ref 4.4–11.3)

## 2025-08-24 PROCEDURE — 2500000004 HC RX 250 GENERAL PHARMACY W/ HCPCS (ALT 636 FOR OP/ED): Performed by: INTERNAL MEDICINE

## 2025-08-24 PROCEDURE — 2060000001 HC INTERMEDIATE ICU ROOM DAILY

## 2025-08-24 PROCEDURE — 2500000004 HC RX 250 GENERAL PHARMACY W/ HCPCS (ALT 636 FOR OP/ED): Performed by: NURSE PRACTITIONER

## 2025-08-24 PROCEDURE — 2500000001 HC RX 250 WO HCPCS SELF ADMINISTERED DRUGS (ALT 637 FOR MEDICARE OP): Performed by: NURSE PRACTITIONER

## 2025-08-24 PROCEDURE — 80053 COMPREHEN METABOLIC PANEL: CPT | Performed by: NURSE PRACTITIONER

## 2025-08-24 PROCEDURE — 99233 SBSQ HOSP IP/OBS HIGH 50: CPT | Performed by: INTERNAL MEDICINE

## 2025-08-24 PROCEDURE — 36415 COLL VENOUS BLD VENIPUNCTURE: CPT | Performed by: NURSE PRACTITIONER

## 2025-08-24 PROCEDURE — 2500000001 HC RX 250 WO HCPCS SELF ADMINISTERED DRUGS (ALT 637 FOR MEDICARE OP): Performed by: INTERNAL MEDICINE

## 2025-08-24 PROCEDURE — 85027 COMPLETE CBC AUTOMATED: CPT | Performed by: NURSE PRACTITIONER

## 2025-08-24 RX ORDER — SODIUM CHLORIDE, SODIUM LACTATE, POTASSIUM CHLORIDE, CALCIUM CHLORIDE 600; 310; 30; 20 MG/100ML; MG/100ML; MG/100ML; MG/100ML
100 INJECTION, SOLUTION INTRAVENOUS CONTINUOUS
Status: ACTIVE | OUTPATIENT
Start: 2025-08-24 | End: 2025-08-25

## 2025-08-24 RX ORDER — ACETAMINOPHEN 325 MG/1
650 TABLET ORAL EVERY 6 HOURS PRN
Status: DISCONTINUED | OUTPATIENT
Start: 2025-08-24 | End: 2025-08-28 | Stop reason: HOSPADM

## 2025-08-24 RX ADMIN — GUAIFENESIN 600 MG: 600 TABLET ORAL at 21:32

## 2025-08-24 RX ADMIN — CEFTRIAXONE 2 G: 2 INJECTION, SOLUTION INTRAVENOUS at 12:42

## 2025-08-24 RX ADMIN — ACETAMINOPHEN 650 MG: 325 TABLET ORAL at 08:58

## 2025-08-24 RX ADMIN — ENOXAPARIN SODIUM 30 MG: 30 INJECTION SUBCUTANEOUS at 12:38

## 2025-08-24 RX ADMIN — PANTOPRAZOLE SODIUM 40 MG: 40 TABLET, DELAYED RELEASE ORAL at 06:12

## 2025-08-24 RX ADMIN — SODIUM CHLORIDE, POTASSIUM CHLORIDE, SODIUM LACTATE AND CALCIUM CHLORIDE 100 ML/HR: 600; 310; 30; 20 INJECTION, SOLUTION INTRAVENOUS at 09:00

## 2025-08-24 RX ADMIN — AZITHROMYCIN MONOHYDRATE 500 MG: 500 INJECTION, POWDER, LYOPHILIZED, FOR SOLUTION INTRAVENOUS at 21:24

## 2025-08-24 ASSESSMENT — PAIN - FUNCTIONAL ASSESSMENT: PAIN_FUNCTIONAL_ASSESSMENT: 0-10

## 2025-08-24 ASSESSMENT — PAIN SCALES - GENERAL: PAINLEVEL_OUTOF10: 0 - NO PAIN

## 2025-08-25 ENCOUNTER — APPOINTMENT (OUTPATIENT)
Dept: OTOLARYNGOLOGY | Facility: HOSPITAL | Age: 70
End: 2025-08-25
Payer: MEDICARE

## 2025-08-25 ENCOUNTER — APPOINTMENT (OUTPATIENT)
Dept: RADIOLOGY | Facility: HOSPITAL | Age: 70
DRG: 871 | End: 2025-08-25
Payer: MEDICARE

## 2025-08-25 LAB
ANION GAP SERPL CALC-SCNC: 13 MMOL/L (ref 10–20)
APPEARANCE UR: CLEAR
BASE EXCESS BLDV CALC-SCNC: 4.4 MMOL/L (ref -2–3)
BILIRUB UR STRIP.AUTO-MCNC: NEGATIVE MG/DL
BODY TEMPERATURE: 37 DEGREES CELSIUS
BUN SERPL-MCNC: 21 MG/DL (ref 6–23)
CALCIUM SERPL-MCNC: 8.3 MG/DL (ref 8.6–10.3)
CHLORIDE SERPL-SCNC: 95 MMOL/L (ref 98–107)
CO2 SERPL-SCNC: 24 MMOL/L (ref 21–32)
COLOR UR: NORMAL
CREAT SERPL-MCNC: 1.07 MG/DL (ref 0.5–1.05)
EGFRCR SERPLBLD CKD-EPI 2021: 56 ML/MIN/1.73M*2
ERYTHROCYTE [DISTWIDTH] IN BLOOD BY AUTOMATED COUNT: 15.5 % (ref 11.5–14.5)
GLUCOSE SERPL-MCNC: 100 MG/DL (ref 74–99)
GLUCOSE UR STRIP.AUTO-MCNC: NORMAL MG/DL
HCO3 BLDV-SCNC: 26.7 MMOL/L (ref 22–26)
HCT VFR BLD AUTO: 30.4 % (ref 36–46)
HGB BLD-MCNC: 10.5 G/DL (ref 12–16)
INHALED O2 CONCENTRATION: 21 %
KETONES UR STRIP.AUTO-MCNC: NEGATIVE MG/DL
LACTATE SERPL-SCNC: 1.2 MMOL/L (ref 0.4–2)
LEUKOCYTE ESTERASE UR QL STRIP.AUTO: NEGATIVE
MAGNESIUM SERPL-MCNC: 1.88 MG/DL (ref 1.6–2.4)
MCH RBC QN AUTO: 30.6 PG (ref 26–34)
MCHC RBC AUTO-ENTMCNC: 34.5 G/DL (ref 32–36)
MCV RBC AUTO: 89 FL (ref 80–100)
NITRITE UR QL STRIP.AUTO: NEGATIVE
NRBC BLD-RTO: 0 /100 WBCS (ref 0–0)
OXYHGB MFR BLDV: 92 % (ref 45–75)
PCO2 BLDV: 32 MM HG (ref 41–51)
PH BLDV: 7.53 PH (ref 7.33–7.43)
PH UR STRIP.AUTO: 6 [PH]
PLATELET # BLD AUTO: 353 X10*3/UL (ref 150–450)
PO2 BLDV: 60 MM HG (ref 35–45)
POTASSIUM SERPL-SCNC: 3.5 MMOL/L (ref 3.5–5.3)
PROT UR STRIP.AUTO-MCNC: NORMAL MG/DL
RBC # BLD AUTO: 3.43 X10*6/UL (ref 4–5.2)
RBC # UR STRIP.AUTO: NEGATIVE MG/DL
RBC #/AREA URNS AUTO: NORMAL /HPF
SAO2 % BLDV: 94 % (ref 45–75)
SODIUM SERPL-SCNC: 128 MMOL/L (ref 136–145)
SP GR UR STRIP.AUTO: 1.01
UROBILINOGEN UR STRIP.AUTO-MCNC: NORMAL MG/DL
WBC # BLD AUTO: 12 X10*3/UL (ref 4.4–11.3)
WBC #/AREA URNS AUTO: NORMAL /HPF

## 2025-08-25 PROCEDURE — 2500000001 HC RX 250 WO HCPCS SELF ADMINISTERED DRUGS (ALT 637 FOR MEDICARE OP): Performed by: NURSE PRACTITIONER

## 2025-08-25 PROCEDURE — 82374 ASSAY BLOOD CARBON DIOXIDE: CPT | Performed by: INTERNAL MEDICINE

## 2025-08-25 PROCEDURE — 87899 AGENT NOS ASSAY W/OPTIC: CPT | Mod: PORLAB | Performed by: NURSE PRACTITIONER

## 2025-08-25 PROCEDURE — 97161 PT EVAL LOW COMPLEX 20 MIN: CPT | Mod: GP

## 2025-08-25 PROCEDURE — 85027 COMPLETE CBC AUTOMATED: CPT | Performed by: INTERNAL MEDICINE

## 2025-08-25 PROCEDURE — 36415 COLL VENOUS BLD VENIPUNCTURE: CPT | Performed by: REGISTERED NURSE

## 2025-08-25 PROCEDURE — 97166 OT EVAL MOD COMPLEX 45 MIN: CPT | Mod: GO | Performed by: OCCUPATIONAL THERAPIST

## 2025-08-25 PROCEDURE — 87506 IADNA-DNA/RNA PROBE TQ 6-11: CPT | Mod: PORLAB | Performed by: NURSE PRACTITIONER

## 2025-08-25 PROCEDURE — 71045 X-RAY EXAM CHEST 1 VIEW: CPT | Performed by: RADIOLOGY

## 2025-08-25 PROCEDURE — 2500000004 HC RX 250 GENERAL PHARMACY W/ HCPCS (ALT 636 FOR OP/ED): Performed by: NURSE PRACTITIONER

## 2025-08-25 PROCEDURE — 2500000004 HC RX 250 GENERAL PHARMACY W/ HCPCS (ALT 636 FOR OP/ED): Performed by: INTERNAL MEDICINE

## 2025-08-25 PROCEDURE — 81001 URINALYSIS AUTO W/SCOPE: CPT | Performed by: NURSE PRACTITIONER

## 2025-08-25 PROCEDURE — 83735 ASSAY OF MAGNESIUM: CPT | Performed by: INTERNAL MEDICINE

## 2025-08-25 PROCEDURE — 36415 COLL VENOUS BLD VENIPUNCTURE: CPT | Performed by: INTERNAL MEDICINE

## 2025-08-25 PROCEDURE — 2500000004 HC RX 250 GENERAL PHARMACY W/ HCPCS (ALT 636 FOR OP/ED): Performed by: STUDENT IN AN ORGANIZED HEALTH CARE EDUCATION/TRAINING PROGRAM

## 2025-08-25 PROCEDURE — 83605 ASSAY OF LACTIC ACID: CPT | Performed by: REGISTERED NURSE

## 2025-08-25 PROCEDURE — 2060000001 HC INTERMEDIATE ICU ROOM DAILY

## 2025-08-25 PROCEDURE — 99233 SBSQ HOSP IP/OBS HIGH 50: CPT | Performed by: INTERNAL MEDICINE

## 2025-08-25 PROCEDURE — 2500000001 HC RX 250 WO HCPCS SELF ADMINISTERED DRUGS (ALT 637 FOR MEDICARE OP): Performed by: INTERNAL MEDICINE

## 2025-08-25 PROCEDURE — 82805 BLOOD GASES W/O2 SATURATION: CPT | Performed by: REGISTERED NURSE

## 2025-08-25 PROCEDURE — 87449 NOS EACH ORGANISM AG IA: CPT | Mod: PORLAB | Performed by: NURSE PRACTITIONER

## 2025-08-25 PROCEDURE — 71045 X-RAY EXAM CHEST 1 VIEW: CPT

## 2025-08-25 RX ORDER — SODIUM CHLORIDE, SODIUM LACTATE, POTASSIUM CHLORIDE, CALCIUM CHLORIDE 600; 310; 30; 20 MG/100ML; MG/100ML; MG/100ML; MG/100ML
100 INJECTION, SOLUTION INTRAVENOUS CONTINUOUS
Status: ACTIVE | OUTPATIENT
Start: 2025-08-25 | End: 2025-08-26

## 2025-08-25 RX ADMIN — AZITHROMYCIN MONOHYDRATE 500 MG: 500 INJECTION, POWDER, LYOPHILIZED, FOR SOLUTION INTRAVENOUS at 20:18

## 2025-08-25 RX ADMIN — PANTOPRAZOLE SODIUM 40 MG: 40 TABLET, DELAYED RELEASE ORAL at 06:14

## 2025-08-25 RX ADMIN — CEFTRIAXONE 2 G: 2 INJECTION, SOLUTION INTRAVENOUS at 12:47

## 2025-08-25 RX ADMIN — SODIUM CHLORIDE, SODIUM LACTATE, POTASSIUM CHLORIDE, AND CALCIUM CHLORIDE 100 ML/HR: .6; .31; .03; .02 INJECTION, SOLUTION INTRAVENOUS at 09:38

## 2025-08-25 RX ADMIN — ACETAMINOPHEN 650 MG: 325 TABLET ORAL at 21:16

## 2025-08-25 RX ADMIN — ENOXAPARIN SODIUM 30 MG: 30 INJECTION SUBCUTANEOUS at 12:47

## 2025-08-25 RX ADMIN — SODIUM CHLORIDE, SODIUM LACTATE, POTASSIUM CHLORIDE, AND CALCIUM CHLORIDE 500 ML: .6; .31; .03; .02 INJECTION, SOLUTION INTRAVENOUS at 23:10

## 2025-08-25 RX ADMIN — ACETAMINOPHEN 650 MG: 325 TABLET ORAL at 09:38

## 2025-08-25 ASSESSMENT — COGNITIVE AND FUNCTIONAL STATUS - GENERAL
MOBILITY SCORE: 23
MOBILITY SCORE: 17
MOVING TO AND FROM BED TO CHAIR: A LITTLE
DAILY ACTIVITIY SCORE: 16
TOILETING: A LOT
CLIMB 3 TO 5 STEPS WITH RAILING: TOTAL
DAILY ACTIVITIY SCORE: 20
EATING MEALS: A LITTLE
CLIMB 3 TO 5 STEPS WITH RAILING: A LITTLE
DRESSING REGULAR UPPER BODY CLOTHING: A LITTLE
PERSONAL GROOMING: A LITTLE
DRESSING REGULAR LOWER BODY CLOTHING: A LITTLE
DRESSING REGULAR UPPER BODY CLOTHING: A LITTLE
HELP NEEDED FOR BATHING: A LOT
TOILETING: A LITTLE
HELP NEEDED FOR BATHING: A LITTLE
WALKING IN HOSPITAL ROOM: A LITTLE
DRESSING REGULAR LOWER BODY CLOTHING: A LITTLE
STANDING UP FROM CHAIR USING ARMS: A LITTLE
TURNING FROM BACK TO SIDE WHILE IN FLAT BAD: A LITTLE

## 2025-08-25 ASSESSMENT — ACTIVITIES OF DAILY LIVING (ADL)
LACK_OF_TRANSPORTATION: NO
ADL_ASSISTANCE: INDEPENDENT
ADL_ASSISTANCE: INDEPENDENT

## 2025-08-25 ASSESSMENT — PAIN - FUNCTIONAL ASSESSMENT
PAIN_FUNCTIONAL_ASSESSMENT: 0-10
PAIN_FUNCTIONAL_ASSESSMENT: 0-10

## 2025-08-25 ASSESSMENT — PAIN SCALES - GENERAL
PAINLEVEL_OUTOF10: 0 - NO PAIN
PAINLEVEL_OUTOF10: 0 - NO PAIN

## 2025-08-26 ENCOUNTER — APPOINTMENT (OUTPATIENT)
Dept: RADIOLOGY | Facility: HOSPITAL | Age: 70
DRG: 871 | End: 2025-08-26
Payer: MEDICARE

## 2025-08-26 LAB
ANION GAP SERPL CALC-SCNC: 10 MMOL/L (ref 10–20)
BASE EXCESS BLDV CALC-SCNC: 7.2 MMOL/L (ref -2–3)
BODY TEMPERATURE: 37 DEGREES CELSIUS
BUN SERPL-MCNC: 13 MG/DL (ref 6–23)
C COLI+JEJ+UPSA DNA STL QL NAA+PROBE: NOT DETECTED
CALCIUM SERPL-MCNC: 7.8 MG/DL (ref 8.6–10.3)
CHLORIDE SERPL-SCNC: 99 MMOL/L (ref 98–107)
CO2 SERPL-SCNC: 29 MMOL/L (ref 21–32)
CREAT SERPL-MCNC: 0.86 MG/DL (ref 0.5–1.05)
EC STX1 GENE STL QL NAA+PROBE: NOT DETECTED
EC STX2 GENE STL QL NAA+PROBE: NOT DETECTED
EGFRCR SERPLBLD CKD-EPI 2021: 73 ML/MIN/1.73M*2
ERYTHROCYTE [DISTWIDTH] IN BLOOD BY AUTOMATED COUNT: 15.9 % (ref 11.5–14.5)
GLUCOSE SERPL-MCNC: 109 MG/DL (ref 74–99)
HCO3 BLDV-SCNC: 31.2 MMOL/L (ref 22–26)
HCT VFR BLD AUTO: 26.4 % (ref 36–46)
HGB BLD-MCNC: 9.2 G/DL (ref 12–16)
HOLD SPECIMEN: NORMAL
INHALED O2 CONCENTRATION: 21 %
LEGIONELLA AG UR QL: POSITIVE
MAGNESIUM SERPL-MCNC: 1.77 MG/DL (ref 1.6–2.4)
MCH RBC QN AUTO: 30.9 PG (ref 26–34)
MCHC RBC AUTO-ENTMCNC: 34.8 G/DL (ref 32–36)
MCV RBC AUTO: 89 FL (ref 80–100)
MRSA DNA SPEC QL NAA+PROBE: NOT DETECTED
NOROVIRUS GI + GII RNA STL NAA+PROBE: NOT DETECTED
NRBC BLD-RTO: 0 /100 WBCS (ref 0–0)
OXYHGB MFR BLDV: 92.8 % (ref 45–75)
PCO2 BLDV: 41 MM HG (ref 41–51)
PH BLDV: 7.49 PH (ref 7.33–7.43)
PLATELET # BLD AUTO: 355 X10*3/UL (ref 150–450)
PO2 BLDV: 61 MM HG (ref 35–45)
POTASSIUM SERPL-SCNC: 3.4 MMOL/L (ref 3.5–5.3)
RBC # BLD AUTO: 2.98 X10*6/UL (ref 4–5.2)
RV RNA STL NAA+PROBE: NOT DETECTED
S PNEUM AG UR QL: NEGATIVE
SALMONELLA DNA STL QL NAA+PROBE: NOT DETECTED
SAO2 % BLDV: 94 % (ref 45–75)
SHIGELLA DNA SPEC QL NAA+PROBE: NOT DETECTED
SODIUM SERPL-SCNC: 135 MMOL/L (ref 136–145)
V CHOLERAE DNA STL QL NAA+PROBE: NOT DETECTED
WBC # BLD AUTO: 10 X10*3/UL (ref 4.4–11.3)
Y ENTEROCOL DNA STL QL NAA+PROBE: NOT DETECTED

## 2025-08-26 PROCEDURE — 36415 COLL VENOUS BLD VENIPUNCTURE: CPT | Performed by: STUDENT IN AN ORGANIZED HEALTH CARE EDUCATION/TRAINING PROGRAM

## 2025-08-26 PROCEDURE — 2500000004 HC RX 250 GENERAL PHARMACY W/ HCPCS (ALT 636 FOR OP/ED): Performed by: STUDENT IN AN ORGANIZED HEALTH CARE EDUCATION/TRAINING PROGRAM

## 2025-08-26 PROCEDURE — 71275 CT ANGIOGRAPHY CHEST: CPT

## 2025-08-26 PROCEDURE — 85027 COMPLETE CBC AUTOMATED: CPT | Performed by: INTERNAL MEDICINE

## 2025-08-26 PROCEDURE — 2500000002 HC RX 250 W HCPCS SELF ADMINISTERED DRUGS (ALT 637 FOR MEDICARE OP, ALT 636 FOR OP/ED): Performed by: INTERNAL MEDICINE

## 2025-08-26 PROCEDURE — 82805 BLOOD GASES W/O2 SATURATION: CPT | Performed by: STUDENT IN AN ORGANIZED HEALTH CARE EDUCATION/TRAINING PROGRAM

## 2025-08-26 PROCEDURE — 2500000004 HC RX 250 GENERAL PHARMACY W/ HCPCS (ALT 636 FOR OP/ED): Performed by: NURSE PRACTITIONER

## 2025-08-26 PROCEDURE — 2500000001 HC RX 250 WO HCPCS SELF ADMINISTERED DRUGS (ALT 637 FOR MEDICARE OP): Performed by: NURSE PRACTITIONER

## 2025-08-26 PROCEDURE — 2500000002 HC RX 250 W HCPCS SELF ADMINISTERED DRUGS (ALT 637 FOR MEDICARE OP, ALT 636 FOR OP/ED): Performed by: STUDENT IN AN ORGANIZED HEALTH CARE EDUCATION/TRAINING PROGRAM

## 2025-08-26 PROCEDURE — 97116 GAIT TRAINING THERAPY: CPT | Mod: GP,CQ | Performed by: PHYSICAL THERAPY ASSISTANT

## 2025-08-26 PROCEDURE — 2500000001 HC RX 250 WO HCPCS SELF ADMINISTERED DRUGS (ALT 637 FOR MEDICARE OP): Performed by: INTERNAL MEDICINE

## 2025-08-26 PROCEDURE — 71275 CT ANGIOGRAPHY CHEST: CPT | Performed by: RADIOLOGY

## 2025-08-26 PROCEDURE — 87641 MR-STAPH DNA AMP PROBE: CPT | Performed by: INTERNAL MEDICINE

## 2025-08-26 PROCEDURE — 80048 BASIC METABOLIC PNL TOTAL CA: CPT | Performed by: INTERNAL MEDICINE

## 2025-08-26 PROCEDURE — 99233 SBSQ HOSP IP/OBS HIGH 50: CPT | Performed by: INTERNAL MEDICINE

## 2025-08-26 PROCEDURE — 2060000001 HC INTERMEDIATE ICU ROOM DAILY

## 2025-08-26 PROCEDURE — 2550000001 HC RX 255 CONTRASTS: Performed by: STUDENT IN AN ORGANIZED HEALTH CARE EDUCATION/TRAINING PROGRAM

## 2025-08-26 PROCEDURE — 83735 ASSAY OF MAGNESIUM: CPT | Performed by: INTERNAL MEDICINE

## 2025-08-26 PROCEDURE — 87040 BLOOD CULTURE FOR BACTERIA: CPT | Mod: PORLAB | Performed by: STUDENT IN AN ORGANIZED HEALTH CARE EDUCATION/TRAINING PROGRAM

## 2025-08-26 PROCEDURE — 2500000004 HC RX 250 GENERAL PHARMACY W/ HCPCS (ALT 636 FOR OP/ED): Performed by: INTERNAL MEDICINE

## 2025-08-26 RX ORDER — AZITHROMYCIN 250 MG/1
500 TABLET, FILM COATED ORAL EVERY 24 HOURS
Status: DISCONTINUED | OUTPATIENT
Start: 2025-08-26 | End: 2025-08-28 | Stop reason: HOSPADM

## 2025-08-26 RX ORDER — POTASSIUM CHLORIDE 20 MEQ/1
40 TABLET, EXTENDED RELEASE ORAL ONCE
Status: COMPLETED | OUTPATIENT
Start: 2025-08-26 | End: 2025-08-26

## 2025-08-26 RX ORDER — CEFEPIME HYDROCHLORIDE 2 G/50ML
2 INJECTION, SOLUTION INTRAVENOUS EVERY 12 HOURS
Status: DISCONTINUED | OUTPATIENT
Start: 2025-08-26 | End: 2025-08-26

## 2025-08-26 RX ORDER — SODIUM CHLORIDE, SODIUM LACTATE, POTASSIUM CHLORIDE, CALCIUM CHLORIDE 600; 310; 30; 20 MG/100ML; MG/100ML; MG/100ML; MG/100ML
100 INJECTION, SOLUTION INTRAVENOUS CONTINUOUS
Status: ACTIVE | OUTPATIENT
Start: 2025-08-26 | End: 2025-08-27

## 2025-08-26 RX ADMIN — SODIUM CHLORIDE, SODIUM LACTATE, POTASSIUM CHLORIDE, AND CALCIUM CHLORIDE 100 ML/HR: .6; .31; .03; .02 INJECTION, SOLUTION INTRAVENOUS at 06:05

## 2025-08-26 RX ADMIN — AZITHROMYCIN DIHYDRATE 500 MG: 250 TABLET ORAL at 11:46

## 2025-08-26 RX ADMIN — ACETAMINOPHEN 650 MG: 325 TABLET ORAL at 20:19

## 2025-08-26 RX ADMIN — POTASSIUM CHLORIDE 40 MEQ: 20 TABLET, EXTENDED RELEASE ORAL at 09:29

## 2025-08-26 RX ADMIN — CEFEPIME HYDROCHLORIDE 2 G: 2 INJECTION, SOLUTION INTRAVENOUS at 09:29

## 2025-08-26 RX ADMIN — IOHEXOL 50 ML: 350 INJECTION, SOLUTION INTRAVENOUS at 00:52

## 2025-08-26 RX ADMIN — PANTOPRAZOLE SODIUM 40 MG: 40 TABLET, DELAYED RELEASE ORAL at 06:06

## 2025-08-26 RX ADMIN — ENOXAPARIN SODIUM 30 MG: 30 INJECTION SUBCUTANEOUS at 14:15

## 2025-08-26 ASSESSMENT — COGNITIVE AND FUNCTIONAL STATUS - GENERAL
DRESSING REGULAR UPPER BODY CLOTHING: A LITTLE
DRESSING REGULAR UPPER BODY CLOTHING: A LITTLE
CLIMB 3 TO 5 STEPS WITH RAILING: A LITTLE
DAILY ACTIVITIY SCORE: 20
MOVING TO AND FROM BED TO CHAIR: A LITTLE
HELP NEEDED FOR BATHING: A LITTLE
DRESSING REGULAR LOWER BODY CLOTHING: A LITTLE
MOBILITY SCORE: 17
STANDING UP FROM CHAIR USING ARMS: A LITTLE
HELP NEEDED FOR BATHING: A LITTLE
CLIMB 3 TO 5 STEPS WITH RAILING: A LOT
TOILETING: A LITTLE
TURNING FROM BACK TO SIDE WHILE IN FLAT BAD: A LITTLE
WALKING IN HOSPITAL ROOM: A LITTLE
MOBILITY SCORE: 23
MOBILITY SCORE: 23
TOILETING: A LITTLE
CLIMB 3 TO 5 STEPS WITH RAILING: A LITTLE
DAILY ACTIVITIY SCORE: 20
DRESSING REGULAR LOWER BODY CLOTHING: A LITTLE
MOVING FROM LYING ON BACK TO SITTING ON SIDE OF FLAT BED WITH BEDRAILS: A LITTLE

## 2025-08-26 ASSESSMENT — PAIN SCALES - GENERAL
PAINLEVEL_OUTOF10: 0 - NO PAIN
PAINLEVEL_OUTOF10: 0 - NO PAIN

## 2025-08-26 ASSESSMENT — PAIN - FUNCTIONAL ASSESSMENT
PAIN_FUNCTIONAL_ASSESSMENT: 0-10
PAIN_FUNCTIONAL_ASSESSMENT: 0-10

## 2025-08-27 ENCOUNTER — TELEPHONE (OUTPATIENT)
Dept: PRIMARY CARE | Facility: CLINIC | Age: 70
End: 2025-08-27
Payer: MEDICARE

## 2025-08-27 LAB
ANION GAP SERPL CALC-SCNC: 10 MMOL/L (ref 10–20)
BACTERIA BLD CULT: NORMAL
BACTERIA BLD CULT: NORMAL
BUN SERPL-MCNC: 10 MG/DL (ref 6–23)
CALCIUM SERPL-MCNC: 7.9 MG/DL (ref 8.6–10.3)
CHLORIDE SERPL-SCNC: 103 MMOL/L (ref 98–107)
CO2 SERPL-SCNC: 28 MMOL/L (ref 21–32)
CREAT SERPL-MCNC: 0.76 MG/DL (ref 0.5–1.05)
EGFRCR SERPLBLD CKD-EPI 2021: 84 ML/MIN/1.73M*2
ERYTHROCYTE [DISTWIDTH] IN BLOOD BY AUTOMATED COUNT: 16.2 % (ref 11.5–14.5)
GLUCOSE SERPL-MCNC: 98 MG/DL (ref 74–99)
HCT VFR BLD AUTO: 27.5 % (ref 36–46)
HGB BLD-MCNC: 9.4 G/DL (ref 12–16)
MAGNESIUM SERPL-MCNC: 1.7 MG/DL (ref 1.6–2.4)
MCH RBC QN AUTO: 31.2 PG (ref 26–34)
MCHC RBC AUTO-ENTMCNC: 34.2 G/DL (ref 32–36)
MCV RBC AUTO: 91 FL (ref 80–100)
NRBC BLD-RTO: 0 /100 WBCS (ref 0–0)
PLATELET # BLD AUTO: 411 X10*3/UL (ref 150–450)
POTASSIUM SERPL-SCNC: 3.6 MMOL/L (ref 3.5–5.3)
RBC # BLD AUTO: 3.01 X10*6/UL (ref 4–5.2)
SODIUM SERPL-SCNC: 137 MMOL/L (ref 136–145)
WBC # BLD AUTO: 10.5 X10*3/UL (ref 4.4–11.3)

## 2025-08-27 PROCEDURE — 97110 THERAPEUTIC EXERCISES: CPT | Mod: GP,CQ

## 2025-08-27 PROCEDURE — 2500000001 HC RX 250 WO HCPCS SELF ADMINISTERED DRUGS (ALT 637 FOR MEDICARE OP): Performed by: NURSE PRACTITIONER

## 2025-08-27 PROCEDURE — 83735 ASSAY OF MAGNESIUM: CPT | Performed by: INTERNAL MEDICINE

## 2025-08-27 PROCEDURE — 36415 COLL VENOUS BLD VENIPUNCTURE: CPT | Performed by: INTERNAL MEDICINE

## 2025-08-27 PROCEDURE — 97530 THERAPEUTIC ACTIVITIES: CPT | Mod: GO,CO

## 2025-08-27 PROCEDURE — 97116 GAIT TRAINING THERAPY: CPT | Mod: GP,CQ

## 2025-08-27 PROCEDURE — 2060000001 HC INTERMEDIATE ICU ROOM DAILY

## 2025-08-27 PROCEDURE — 85027 COMPLETE CBC AUTOMATED: CPT | Performed by: INTERNAL MEDICINE

## 2025-08-27 PROCEDURE — 2500000004 HC RX 250 GENERAL PHARMACY W/ HCPCS (ALT 636 FOR OP/ED): Performed by: NURSE PRACTITIONER

## 2025-08-27 PROCEDURE — 97110 THERAPEUTIC EXERCISES: CPT | Mod: GO,CO

## 2025-08-27 PROCEDURE — 80048 BASIC METABOLIC PNL TOTAL CA: CPT | Performed by: INTERNAL MEDICINE

## 2025-08-27 PROCEDURE — 2500000002 HC RX 250 W HCPCS SELF ADMINISTERED DRUGS (ALT 637 FOR MEDICARE OP, ALT 636 FOR OP/ED): Performed by: STUDENT IN AN ORGANIZED HEALTH CARE EDUCATION/TRAINING PROGRAM

## 2025-08-27 PROCEDURE — 99233 SBSQ HOSP IP/OBS HIGH 50: CPT | Performed by: INTERNAL MEDICINE

## 2025-08-27 RX ADMIN — GUAIFENESIN 600 MG: 600 TABLET ORAL at 09:59

## 2025-08-27 RX ADMIN — ENOXAPARIN SODIUM 30 MG: 30 INJECTION SUBCUTANEOUS at 13:27

## 2025-08-27 RX ADMIN — PANTOPRAZOLE SODIUM 40 MG: 40 TABLET, DELAYED RELEASE ORAL at 05:27

## 2025-08-27 RX ADMIN — AZITHROMYCIN DIHYDRATE 500 MG: 250 TABLET ORAL at 11:00

## 2025-08-27 ASSESSMENT — COGNITIVE AND FUNCTIONAL STATUS - GENERAL
EATING MEALS: A LITTLE
DAILY ACTIVITIY SCORE: 20
MOBILITY SCORE: 17
DRESSING REGULAR LOWER BODY CLOTHING: A LITTLE
MOBILITY SCORE: 23
WALKING IN HOSPITAL ROOM: A LITTLE
MOBILITY SCORE: 23
CLIMB 3 TO 5 STEPS WITH RAILING: A LITTLE
HELP NEEDED FOR BATHING: A LITTLE
CLIMB 3 TO 5 STEPS WITH RAILING: A LITTLE
HELP NEEDED FOR BATHING: A LOT
MOVING TO AND FROM BED TO CHAIR: A LITTLE
HELP NEEDED FOR BATHING: A LITTLE
DRESSING REGULAR UPPER BODY CLOTHING: A LITTLE
PERSONAL GROOMING: A LITTLE
TURNING FROM BACK TO SIDE WHILE IN FLAT BAD: A LITTLE
STANDING UP FROM CHAIR USING ARMS: A LITTLE
DAILY ACTIVITIY SCORE: 20
DRESSING REGULAR UPPER BODY CLOTHING: A LITTLE
CLIMB 3 TO 5 STEPS WITH RAILING: A LOT
DAILY ACTIVITIY SCORE: 16
MOVING FROM LYING ON BACK TO SITTING ON SIDE OF FLAT BED WITH BEDRAILS: A LITTLE
DRESSING REGULAR UPPER BODY CLOTHING: A LITTLE
TOILETING: A LITTLE
TOILETING: A LOT
TOILETING: A LITTLE

## 2025-08-27 ASSESSMENT — PAIN SCALES - GENERAL
PAINLEVEL_OUTOF10: 0 - NO PAIN

## 2025-08-27 ASSESSMENT — PAIN - FUNCTIONAL ASSESSMENT
PAIN_FUNCTIONAL_ASSESSMENT: 0-10

## 2025-08-28 ENCOUNTER — APPOINTMENT (OUTPATIENT)
Dept: CARDIOLOGY | Facility: HOSPITAL | Age: 70
DRG: 871 | End: 2025-08-28
Payer: MEDICARE

## 2025-08-28 ENCOUNTER — PHARMACY VISIT (OUTPATIENT)
Dept: PHARMACY | Facility: CLINIC | Age: 70
End: 2025-08-28
Payer: MEDICARE

## 2025-08-28 VITALS
OXYGEN SATURATION: 91 % | SYSTOLIC BLOOD PRESSURE: 146 MMHG | WEIGHT: 105.38 LBS | RESPIRATION RATE: 20 BRPM | TEMPERATURE: 98.8 F | HEIGHT: 62 IN | HEART RATE: 80 BPM | BODY MASS INDEX: 19.39 KG/M2 | DIASTOLIC BLOOD PRESSURE: 71 MMHG

## 2025-08-28 PROBLEM — A41.9 SEPSIS, DUE TO UNSPECIFIED ORGANISM, UNSPECIFIED WHETHER ACUTE ORGAN DYSFUNCTION PRESENT (MULTI): Status: RESOLVED | Noted: 2025-08-23 | Resolved: 2025-08-28

## 2025-08-28 LAB
ANION GAP SERPL CALC-SCNC: 12 MMOL/L (ref 10–20)
ATRIAL RATE: 80 BPM
ATRIAL RATE: 91 BPM
BUN SERPL-MCNC: 9 MG/DL (ref 6–23)
CALCIUM SERPL-MCNC: 7.6 MG/DL (ref 8.6–10.3)
CHLORIDE SERPL-SCNC: 100 MMOL/L (ref 98–107)
CO2 SERPL-SCNC: 28 MMOL/L (ref 21–32)
CREAT SERPL-MCNC: 0.59 MG/DL (ref 0.5–1.05)
EGFRCR SERPLBLD CKD-EPI 2021: >90 ML/MIN/1.73M*2
GLUCOSE SERPL-MCNC: 82 MG/DL (ref 74–99)
MAGNESIUM SERPL-MCNC: 1.49 MG/DL (ref 1.6–2.4)
P AXIS: 61 DEGREES
P AXIS: 9 DEGREES
P OFFSET: 175 MS
P ONSET: 120 MS
POTASSIUM SERPL-SCNC: 3.5 MMOL/L (ref 3.5–5.3)
PR INTERVAL: 151 MS
PR INTERVAL: 200 MS
Q ONSET: 220 MS
Q ONSET: 249 MS
QRS COUNT: 13 BEATS
QRS COUNT: 15 BEATS
QRS DURATION: 104 MS
QRS DURATION: 90 MS
QT INTERVAL: 356 MS
QT INTERVAL: 384 MS
QTC CALCULATION(BAZETT): 439 MS
QTC CALCULATION(BAZETT): 442 MS
QTC FREDERICIA: 409 MS
QTC FREDERICIA: 423 MS
R AXIS: 23 DEGREES
R AXIS: 43 DEGREES
SODIUM SERPL-SCNC: 136 MMOL/L (ref 136–145)
T AXIS: 49 DEGREES
T AXIS: 65 DEGREES
T OFFSET: 412 MS
T OFFSET: 427 MS
VENTRICULAR RATE: 80 BPM
VENTRICULAR RATE: 91 BPM

## 2025-08-28 PROCEDURE — 2500000002 HC RX 250 W HCPCS SELF ADMINISTERED DRUGS (ALT 637 FOR MEDICARE OP, ALT 636 FOR OP/ED): Performed by: STUDENT IN AN ORGANIZED HEALTH CARE EDUCATION/TRAINING PROGRAM

## 2025-08-28 PROCEDURE — 93005 ELECTROCARDIOGRAM TRACING: CPT

## 2025-08-28 PROCEDURE — 80048 BASIC METABOLIC PNL TOTAL CA: CPT | Performed by: INTERNAL MEDICINE

## 2025-08-28 PROCEDURE — 2500000005 HC RX 250 GENERAL PHARMACY W/O HCPCS: Performed by: INTERNAL MEDICINE

## 2025-08-28 PROCEDURE — 2500000001 HC RX 250 WO HCPCS SELF ADMINISTERED DRUGS (ALT 637 FOR MEDICARE OP): Performed by: NURSE PRACTITIONER

## 2025-08-28 PROCEDURE — 2500000004 HC RX 250 GENERAL PHARMACY W/ HCPCS (ALT 636 FOR OP/ED): Performed by: INTERNAL MEDICINE

## 2025-08-28 PROCEDURE — 99239 HOSP IP/OBS DSCHRG MGMT >30: CPT | Performed by: INTERNAL MEDICINE

## 2025-08-28 PROCEDURE — 83735 ASSAY OF MAGNESIUM: CPT | Performed by: INTERNAL MEDICINE

## 2025-08-28 PROCEDURE — RXMED WILLOW AMBULATORY MEDICATION CHARGE

## 2025-08-28 PROCEDURE — 94761 N-INVAS EAR/PLS OXIMETRY MLT: CPT

## 2025-08-28 PROCEDURE — 36415 COLL VENOUS BLD VENIPUNCTURE: CPT | Performed by: INTERNAL MEDICINE

## 2025-08-28 RX ORDER — MAGNESIUM SULFATE HEPTAHYDRATE 40 MG/ML
2 INJECTION, SOLUTION INTRAVENOUS ONCE
Status: COMPLETED | OUTPATIENT
Start: 2025-08-28 | End: 2025-08-28

## 2025-08-28 RX ORDER — AZITHROMYCIN 500 MG/1
500 TABLET, FILM COATED ORAL DAILY
Qty: 8 TABLET | Refills: 0 | Status: SHIPPED | OUTPATIENT
Start: 2025-08-29 | End: 2025-09-06

## 2025-08-28 RX ADMIN — Medication 1 DOSE: at 11:00

## 2025-08-28 RX ADMIN — PANTOPRAZOLE SODIUM 40 MG: 40 TABLET, DELAYED RELEASE ORAL at 05:28

## 2025-08-28 RX ADMIN — AZITHROMYCIN DIHYDRATE 500 MG: 250 TABLET ORAL at 12:23

## 2025-08-28 RX ADMIN — MAGNESIUM SULFATE HEPTAHYDRATE 2 G: 40 INJECTION, SOLUTION INTRAVENOUS at 09:37

## 2025-08-28 ASSESSMENT — PAIN - FUNCTIONAL ASSESSMENT
PAIN_FUNCTIONAL_ASSESSMENT: 0-10
PAIN_FUNCTIONAL_ASSESSMENT: 0-10

## 2025-08-28 ASSESSMENT — PAIN SCALES - GENERAL
PAINLEVEL_OUTOF10: 0 - NO PAIN
PAINLEVEL_OUTOF10: 0 - NO PAIN

## 2025-08-29 ENCOUNTER — PATIENT OUTREACH (OUTPATIENT)
Dept: PRIMARY CARE | Facility: CLINIC | Age: 70
End: 2025-08-29
Payer: MEDICARE

## 2025-08-30 LAB
BACTERIA BLD CULT: NORMAL
BACTERIA BLD CULT: NORMAL

## 2025-09-09 ENCOUNTER — APPOINTMENT (OUTPATIENT)
Dept: PRIMARY CARE | Facility: CLINIC | Age: 70
End: 2025-09-09
Payer: MEDICARE

## 2025-10-09 ENCOUNTER — APPOINTMENT (OUTPATIENT)
Dept: PRIMARY CARE | Facility: CLINIC | Age: 70
End: 2025-10-09
Payer: MEDICARE

## (undated) DEVICE — COUNTER, NEEDLE, FOAM BLOCK, W/MAGNET, W/BLADE GUARD, 10 COUNT, RED, LF

## (undated) DEVICE — COVER, MAYO STAND, W/PAD, 23 IN, DISPOSABLE, PLASTIC, LF, STERILE

## (undated) DEVICE — SHIELD, EYE, FOX, CONVEX, ALUMINUM, NS

## (undated) DEVICE — REST, HEAD, BAGEL, 9 IN

## (undated) DEVICE — Device

## (undated) DEVICE — SYRINGE, 60 CC, IRRIGATION, BULB, CONTRO-BULB, PAPER POUCH

## (undated) DEVICE — MANIFOLD, 4 PORT NEPTUNE STANDARD

## (undated) DEVICE — ACUSPOT 712 MICROMANIPULATOR

## (undated) DEVICE — DENTITION PROTECTOR, QUICKGUARD, NON-PERF

## (undated) DEVICE — MARKER, SKIN, RULER AND LABEL PACK, CUSTOM

## (undated) DEVICE — SYRINGE, HYPODERMIC, TB, W/O NEEDLE, 1 CC, SLIP TIP

## (undated) DEVICE — CUP, SOLUTION

## (undated) DEVICE — PITCHER, GRADUATE, 32 OZ (1200CC), STERILE

## (undated) DEVICE — TUBING, SUCTION, CONNECTING, STERILE 0.25 X 120 IN., LF

## (undated) DEVICE — COVER, TABLE, 44 X 75 IN, DISPOSABLE, LF, STERILE

## (undated) DEVICE — HOLSTER, JET SAFETY

## (undated) DEVICE — PAD, EYE, OVAL, 1 5/8 X 2 5/8 IN, STERILE

## (undated) DEVICE — FIBER KTP/YAG LP .4MM BARE

## (undated) DEVICE — COVER, CART, 45 X 27 X 48 IN, CLEAR

## (undated) DEVICE — DRESSING, GAUZE, 16 PLY, 4 X 4 IN, STERILE

## (undated) DEVICE — NEEDLE, INJECTION, OROTRACHEAL